# Patient Record
Sex: FEMALE | Race: BLACK OR AFRICAN AMERICAN | NOT HISPANIC OR LATINO | Employment: PART TIME
[De-identification: names, ages, dates, MRNs, and addresses within clinical notes are randomized per-mention and may not be internally consistent; named-entity substitution may affect disease eponyms.]

---

## 2020-01-03 ENCOUNTER — HOSPITAL (OUTPATIENT)
Dept: OTHER | Age: 45
End: 2020-01-03

## 2020-01-03 LAB
ALBUMIN SERPL-MCNC: 3.6 G/DL (ref 3.6–5.1)
ALBUMIN/GLOB SERPL: 1 {RATIO} (ref 1–2.4)
ALP SERPL-CCNC: 50 UNITS/L (ref 45–117)
ALT SERPL-CCNC: 22 UNITS/L
AMORPH SED URNS QL MICRO: ABNORMAL
ANALYZER ANC (IANC): ABNORMAL
ANION GAP SERPL CALC-SCNC: 11 MMOL/L (ref 10–20)
APPEARANCE UR: CLEAR
APPEARANCE UR: CLEAR
AST SERPL-CCNC: 17 UNITS/L
BACTERIA #/AREA URNS HPF: ABNORMAL /HPF
BASOPHILS # BLD: 0 K/MCL (ref 0–0.3)
BASOPHILS NFR BLD: 0 %
BILIRUB SERPL-MCNC: 0.7 MG/DL (ref 0.2–1)
BILIRUB UR QL: NEGATIVE
BILIRUB UR QL: NEGATIVE
BUN SERPL-MCNC: 10 MG/DL (ref 6–20)
BUN/CREAT SERPL: 13 (ref 7–25)
CALCIUM SERPL-MCNC: 8.6 MG/DL (ref 8.4–10.2)
CAOX CRY URNS QL MICRO: ABNORMAL
CHLORIDE SERPL-SCNC: 106 MMOL/L (ref 98–107)
CLUE CELLS SPEC QL WET PREP: NORMAL
CO2 SERPL-SCNC: 25 MMOL/L (ref 21–32)
COLOR UR: YELLOW
COLOR UR: YELLOW
CREAT SERPL-MCNC: 0.8 MG/DL (ref 0.51–0.95)
DIFFERENTIAL METHOD BLD: ABNORMAL
EOSINOPHIL # BLD: 0.2 K/MCL (ref 0.1–0.5)
EOSINOPHIL NFR BLD: 5 %
EPITH CASTS #/AREA URNS LPF: ABNORMAL /[LPF]
ERYTHROCYTE [DISTWIDTH] IN BLOOD: 12.9 % (ref 11–15)
FATTY CASTS #/AREA URNS LPF: ABNORMAL /[LPF]
GLOBULIN SER-MCNC: 3.7 G/DL (ref 2–4)
GLUCOSE SERPL-MCNC: 87 MG/DL (ref 65–99)
GLUCOSE UR-MCNC: NEGATIVE MG/DL
GLUCOSE UR-MCNC: NEGATIVE MG/DL
GRAN CASTS #/AREA URNS LPF: ABNORMAL /[LPF]
HCG POINT OF CARE (5HGRST): NEGATIVE
HCT VFR BLD CALC: 37.5 % (ref 36–46.5)
HGB BLD-MCNC: 12 G/DL (ref 12–15.5)
HGB UR QL: NEGATIVE
HGB UR QL: NEGATIVE
HYALINE CASTS #/AREA URNS LPF: ABNORMAL /LPF (ref 0–5)
IMM GRANULOCYTES # BLD AUTO: 0 K/MCL (ref 0–0.2)
IMM GRANULOCYTES NFR BLD: 0 %
KETONES UR-MCNC: ABNORMAL MG/DL
KETONES UR-MCNC: ABNORMAL MG/DL
LEUKOCYTE ESTERASE UR QL STRIP: NEGATIVE
LEUKOCYTE ESTERASE UR QL STRIP: NEGATIVE
LYMPHOCYTES # BLD: 2.7 K/MCL (ref 1–4.8)
LYMPHOCYTES NFR BLD: 57 %
MCH RBC QN AUTO: 29.1 PG (ref 26–34)
MCHC RBC AUTO-ENTMCNC: 32 G/DL (ref 32–36.5)
MCV RBC AUTO: 90.8 FL (ref 78–100)
MICROSCOPIC (MT): ABNORMAL
MIXED CELL CASTS #/AREA URNS LPF: ABNORMAL /[LPF]
MONOCYTES # BLD: 0.5 K/MCL (ref 0.3–0.9)
MONOCYTES NFR BLD: 11 %
MUCOUS THREADS URNS QL MICRO: PRESENT
NEUTROPHILS # BLD: 1.3 K/MCL (ref 1.8–7.7)
NEUTROPHILS NFR BLD: 27 %
NEUTS SEG NFR BLD: ABNORMAL %
NITRITE UR QL: NEGATIVE
NITRITE UR QL: NEGATIVE
NRBC (NRBCRE): 0 /100 WBC
PH UR: 5 UNITS (ref 5–7)
PH UR: 5 UNITS (ref 5–7)
PLATELET # BLD: 301 K/MCL (ref 140–450)
POTASSIUM SERPL-SCNC: 3.6 MMOL/L (ref 3.4–5.1)
PROT SERPL-MCNC: 7.3 G/DL (ref 6.4–8.2)
PROT UR QL: NEGATIVE MG/DL
PROT UR QL: NEGATIVE MG/DL
RBC # BLD: 4.13 MIL/MCL (ref 4–5.2)
RBC #/AREA URNS HPF: ABNORMAL /HPF (ref 0–2)
RBC CASTS #/AREA URNS LPF: ABNORMAL /[LPF]
RENAL EPI CELLS #/AREA URNS HPF: ABNORMAL /[HPF]
SODIUM SERPL-SCNC: 138 MMOL/L (ref 135–145)
SP GR UR: 1.03 (ref 1–1.03)
SP GR UR: 1.03 (ref 1–1.03)
SPECIMEN SOURCE: ABNORMAL
SPECIMEN SOURCE: ABNORMAL
SPERM URNS QL MICRO: ABNORMAL
SQUAMOUS #/AREA URNS HPF: ABNORMAL /HPF (ref 0–5)
T VAGINALIS SPEC QL WET PREP: NORMAL
T VAGINALIS URNS QL MICRO: ABNORMAL
TRI-PHOS CRY URNS QL MICRO: ABNORMAL
URATE CRY URNS QL MICRO: ABNORMAL
UROBILINOGEN UR QL: 0.2 MG/DL (ref 0–1)
UROBILINOGEN UR QL: 2 MG/DL (ref 0–1)
WAXY CASTS #/AREA URNS LPF: ABNORMAL /[LPF]
WBC # BLD: 4.7 K/MCL (ref 4.2–11)
WBC #/AREA URNS HPF: ABNORMAL /HPF (ref 0–5)
WBC CASTS #/AREA URNS LPF: ABNORMAL /[LPF]
YEAST HYPHAE URNS QL MICRO: ABNORMAL
YEAST SPEC QL WET PREP: NORMAL
YEAST URNS QL MICRO: ABNORMAL

## 2020-01-03 PROCEDURE — 99284 EMERGENCY DEPT VISIT MOD MDM: CPT | Performed by: EMERGENCY MEDICINE

## 2021-08-26 ENCOUNTER — APPOINTMENT (OUTPATIENT)
Dept: CT IMAGING | Age: 46
End: 2021-08-26
Attending: EMERGENCY MEDICINE

## 2021-08-26 ENCOUNTER — HOSPITAL ENCOUNTER (EMERGENCY)
Age: 46
Discharge: HOME | End: 2021-08-27
Attending: EMERGENCY MEDICINE

## 2021-08-26 DIAGNOSIS — N94.0 MITTELSCHMERZ: ICD-10-CM

## 2021-08-26 DIAGNOSIS — R10.2 PELVIC PAIN IN FEMALE: Primary | ICD-10-CM

## 2021-08-26 DIAGNOSIS — N94.89 PELVIC CONGESTION: ICD-10-CM

## 2021-08-26 LAB
ALBUMIN SERPL-MCNC: 3.3 G/DL (ref 3.6–5.1)
ALBUMIN/GLOB SERPL: 0.9 {RATIO} (ref 1–2.4)
ALP SERPL-CCNC: 53 UNITS/L (ref 45–117)
ALT SERPL-CCNC: 17 UNITS/L
ANION GAP SERPL CALC-SCNC: 6 MMOL/L (ref 10–20)
AST SERPL-CCNC: 15 UNITS/L
BASOPHILS # BLD: 0 K/MCL (ref 0–0.3)
BASOPHILS NFR BLD: 0 %
BILIRUB SERPL-MCNC: 0.2 MG/DL (ref 0.2–1)
BUN SERPL-MCNC: 10 MG/DL (ref 6–20)
BUN/CREAT SERPL: 12 (ref 7–25)
CALCIUM SERPL-MCNC: 8.5 MG/DL (ref 8.4–10.2)
CHLORIDE SERPL-SCNC: 110 MMOL/L (ref 98–107)
CO2 SERPL-SCNC: 27 MMOL/L (ref 21–32)
CREAT SERPL-MCNC: 0.82 MG/DL (ref 0.51–0.95)
DEPRECATED RDW RBC: 43.7 FL (ref 39–50)
EOSINOPHIL # BLD: 0.2 K/MCL (ref 0–0.5)
EOSINOPHIL NFR BLD: 4 %
ERYTHROCYTE [DISTWIDTH] IN BLOOD: 13.1 % (ref 11–15)
FASTING DURATION TIME PATIENT: ABNORMAL H
GFR SERPLBLD BASED ON 1.73 SQ M-ARVRAT: >90 ML/MIN
GLOBULIN SER-MCNC: 3.7 G/DL (ref 2–4)
GLUCOSE SERPL-MCNC: 98 MG/DL (ref 65–99)
HCG SERPL QL: NEGATIVE
HCT VFR BLD CALC: 35.2 % (ref 36–46.5)
HGB BLD-MCNC: 11.1 G/DL (ref 12–15.5)
IMM GRANULOCYTES # BLD AUTO: 0 K/MCL (ref 0–0.2)
IMM GRANULOCYTES # BLD: 0 %
LIPASE SERPL-CCNC: 120 UNITS/L (ref 73–393)
LYMPHOCYTES # BLD: 3.2 K/MCL (ref 1–4.8)
LYMPHOCYTES NFR BLD: 59 %
MAGNESIUM SERPL-MCNC: 2 MG/DL (ref 1.7–2.4)
MCH RBC QN AUTO: 28.8 PG (ref 26–34)
MCHC RBC AUTO-ENTMCNC: 31.5 G/DL (ref 32–36.5)
MCV RBC AUTO: 91.4 FL (ref 78–100)
MONOCYTES # BLD: 0.4 K/MCL (ref 0.3–0.9)
MONOCYTES NFR BLD: 8 %
NEUTROPHILS # BLD: 1.6 K/MCL (ref 1.8–7.7)
NEUTROPHILS NFR BLD: 29 %
NRBC BLD MANUAL-RTO: 0 /100 WBC
PLATELET # BLD AUTO: 278 K/MCL (ref 140–450)
POTASSIUM SERPL-SCNC: 4.1 MMOL/L (ref 3.4–5.1)
PROT SERPL-MCNC: 7 G/DL (ref 6.4–8.2)
RBC # BLD: 3.85 MIL/MCL (ref 4–5.2)
SODIUM SERPL-SCNC: 139 MMOL/L (ref 135–145)
WBC # BLD: 5.5 K/MCL (ref 4.2–11)

## 2021-08-26 PROCEDURE — 10002805 HB CONTRAST AGENT: Performed by: EMERGENCY MEDICINE

## 2021-08-26 PROCEDURE — 80053 COMPREHEN METABOLIC PANEL: CPT | Performed by: EMERGENCY MEDICINE

## 2021-08-26 PROCEDURE — 84703 CHORIONIC GONADOTROPIN ASSAY: CPT | Performed by: EMERGENCY MEDICINE

## 2021-08-26 PROCEDURE — 85025 COMPLETE CBC W/AUTO DIFF WBC: CPT | Performed by: EMERGENCY MEDICINE

## 2021-08-26 PROCEDURE — 10002807 HB RX 258: Performed by: EMERGENCY MEDICINE

## 2021-08-26 PROCEDURE — 74177 CT ABD & PELVIS W/CONTRAST: CPT

## 2021-08-26 PROCEDURE — 99284 EMERGENCY DEPT VISIT MOD MDM: CPT

## 2021-08-26 PROCEDURE — 10002800 HB RX 250 W HCPCS: Performed by: EMERGENCY MEDICINE

## 2021-08-26 PROCEDURE — 96374 THER/PROPH/DIAG INJ IV PUSH: CPT

## 2021-08-26 PROCEDURE — 96375 TX/PRO/DX INJ NEW DRUG ADDON: CPT

## 2021-08-26 PROCEDURE — 83735 ASSAY OF MAGNESIUM: CPT | Performed by: EMERGENCY MEDICINE

## 2021-08-26 PROCEDURE — 83690 ASSAY OF LIPASE: CPT | Performed by: EMERGENCY MEDICINE

## 2021-08-26 PROCEDURE — 96361 HYDRATE IV INFUSION ADD-ON: CPT

## 2021-08-26 RX ADMIN — IOHEXOL 85 ML: 350 INJECTION, SOLUTION INTRAVENOUS at 22:28

## 2021-08-26 RX ADMIN — KETOROLAC TROMETHAMINE 15 MG: 15 INJECTION, SOLUTION INTRAMUSCULAR; INTRAVENOUS at 22:20

## 2021-08-26 RX ADMIN — FENTANYL CITRATE 100 MCG: 50 INJECTION INTRAMUSCULAR; INTRAVENOUS at 22:21

## 2021-08-26 RX ADMIN — SODIUM CHLORIDE, POTASSIUM CHLORIDE, SODIUM LACTATE AND CALCIUM CHLORIDE 1000 ML: 600; 310; 30; 20 INJECTION, SOLUTION INTRAVENOUS at 22:20

## 2021-08-27 ENCOUNTER — APPOINTMENT (OUTPATIENT)
Dept: ULTRASOUND IMAGING | Age: 46
End: 2021-08-27
Attending: EMERGENCY MEDICINE

## 2021-08-27 VITALS
TEMPERATURE: 97.9 F | HEIGHT: 64 IN | WEIGHT: 172.18 LBS | RESPIRATION RATE: 16 BRPM | DIASTOLIC BLOOD PRESSURE: 82 MMHG | OXYGEN SATURATION: 100 % | BODY MASS INDEX: 29.4 KG/M2 | HEART RATE: 62 BPM | SYSTOLIC BLOOD PRESSURE: 99 MMHG

## 2021-08-27 PROCEDURE — 10002803 HB RX 637: Performed by: EMERGENCY MEDICINE

## 2021-08-27 PROCEDURE — 93975 VASCULAR STUDY: CPT

## 2021-08-27 PROCEDURE — 76856 US EXAM PELVIC COMPLETE: CPT

## 2021-08-27 RX ORDER — HYDROCODONE BITARTRATE AND ACETAMINOPHEN 5; 325 MG/1; MG/1
1-2 TABLET ORAL 2 TIMES DAILY PRN
Qty: 11 TABLET | Refills: 0 | Status: SHIPPED | OUTPATIENT
Start: 2021-08-27 | End: 2021-09-03

## 2021-08-27 RX ORDER — IBUPROFEN 600 MG/1
600 TABLET ORAL EVERY 8 HOURS PRN
Qty: 21 TABLET | Refills: 0 | Status: SHIPPED | OUTPATIENT
Start: 2021-08-27 | End: 2021-09-03

## 2021-08-27 RX ORDER — HYDROCODONE BITARTRATE AND ACETAMINOPHEN 5; 325 MG/1; MG/1
2 TABLET ORAL ONCE
Status: COMPLETED | OUTPATIENT
Start: 2021-08-27 | End: 2021-08-27

## 2021-08-27 RX ADMIN — HYDROCODONE BITARTRATE AND ACETAMINOPHEN 2 TABLET: 5; 325 TABLET ORAL at 02:03

## 2024-12-03 ENCOUNTER — APPOINTMENT (OUTPATIENT)
Dept: GENERAL RADIOLOGY | Age: 49
End: 2024-12-03

## 2024-12-03 ENCOUNTER — HOSPITAL ENCOUNTER (EMERGENCY)
Age: 49
Discharge: HOME | End: 2024-12-03

## 2024-12-03 ENCOUNTER — APPOINTMENT (OUTPATIENT)
Dept: CT IMAGING | Age: 49
End: 2024-12-03

## 2024-12-03 VITALS
DIASTOLIC BLOOD PRESSURE: 83 MMHG | SYSTOLIC BLOOD PRESSURE: 117 MMHG | HEIGHT: 65 IN | HEART RATE: 72 BPM | WEIGHT: 185.41 LBS | BODY MASS INDEX: 30.89 KG/M2 | TEMPERATURE: 98.1 F | OXYGEN SATURATION: 99 % | RESPIRATION RATE: 17 BRPM

## 2024-12-03 DIAGNOSIS — R42 DIZZINESS: Primary | ICD-10-CM

## 2024-12-03 DIAGNOSIS — K21.9 GASTROESOPHAGEAL REFLUX DISEASE, UNSPECIFIED WHETHER ESOPHAGITIS PRESENT: ICD-10-CM

## 2024-12-03 LAB
ALBUMIN SERPL-MCNC: 3.7 G/DL (ref 3.4–5)
ALBUMIN/GLOB SERPL: 0.9 {RATIO} (ref 1–2.4)
ALP SERPL-CCNC: 60 UNITS/L (ref 45–117)
ALT SERPL-CCNC: 33 UNITS/L
ANION GAP SERPL CALC-SCNC: 11 MMOL/L (ref 7–19)
AST SERPL-CCNC: 23 UNITS/L
BASOPHILS # BLD: 0 K/MCL (ref 0–0.3)
BASOPHILS NFR BLD: 1 %
BILIRUB SERPL-MCNC: 0.4 MG/DL (ref 0.2–1)
BUN SERPL-MCNC: 14 MG/DL (ref 6–20)
BUN/CREAT SERPL: 17 (ref 7–25)
CALCIUM SERPL-MCNC: 8.6 MG/DL (ref 8.4–10.2)
CHLORIDE SERPL-SCNC: 103 MMOL/L (ref 97–110)
CO2 SERPL-SCNC: 29 MMOL/L (ref 21–32)
CREAT SERPL-MCNC: 0.81 MG/DL (ref 0.51–0.95)
DEPRECATED RDW RBC: 43.8 FL (ref 39–50)
EGFRCR SERPLBLD CKD-EPI 2021: 90 ML/MIN/{1.73_M2}
EOSINOPHIL # BLD: 0.1 K/MCL (ref 0–0.5)
EOSINOPHIL NFR BLD: 2 %
ERYTHROCYTE [DISTWIDTH] IN BLOOD: 13.2 % (ref 11–15)
FASTING DURATION TIME PATIENT: ABNORMAL H
GLOBULIN SER-MCNC: 3.9 G/DL (ref 2–4)
GLUCOSE SERPL-MCNC: 92 MG/DL (ref 70–99)
HCT VFR BLD CALC: 39.1 % (ref 36–46.5)
HGB BLD-MCNC: 12.4 G/DL (ref 12–15.5)
IMM GRANULOCYTES # BLD AUTO: 0 K/MCL (ref 0–0.2)
IMM GRANULOCYTES # BLD: 0 %
LIPASE SERPL-CCNC: 33 UNITS/L (ref 15–77)
LYMPHOCYTES # BLD: 1.6 K/MCL (ref 1–4.8)
LYMPHOCYTES NFR BLD: 38 %
MCH RBC QN AUTO: 28.7 PG (ref 26–34)
MCHC RBC AUTO-ENTMCNC: 31.7 G/DL (ref 32–36.5)
MCV RBC AUTO: 90.5 FL (ref 78–100)
MONOCYTES # BLD: 0.3 K/MCL (ref 0.3–0.9)
MONOCYTES NFR BLD: 8 %
NEUTROPHILS # BLD: 2.1 K/MCL (ref 1.8–7.7)
NEUTROPHILS NFR BLD: 51 %
NRBC BLD MANUAL-RTO: 0 /100 WBC
NT-PROBNP SERPL-MCNC: <35 PG/ML
PLATELET # BLD AUTO: 267 K/MCL (ref 140–450)
POTASSIUM SERPL-SCNC: 4 MMOL/L (ref 3.4–5.1)
PROT SERPL-MCNC: 7.6 G/DL (ref 6.4–8.2)
RBC # BLD: 4.32 MIL/MCL (ref 4–5.2)
SODIUM SERPL-SCNC: 139 MMOL/L (ref 135–145)
TROPONIN I SERPL DL<=0.01 NG/ML-MCNC: <4 NG/L
TSH SERPL-ACNC: 1.05 MCUNITS/ML (ref 0.35–5)
WBC # BLD: 4.1 K/MCL (ref 4.2–11)

## 2024-12-03 PROCEDURE — 85025 COMPLETE CBC W/AUTO DIFF WBC: CPT

## 2024-12-03 PROCEDURE — 96375 TX/PRO/DX INJ NEW DRUG ADDON: CPT

## 2024-12-03 PROCEDURE — 93005 ELECTROCARDIOGRAM TRACING: CPT

## 2024-12-03 PROCEDURE — 83690 ASSAY OF LIPASE: CPT

## 2024-12-03 PROCEDURE — 70498 CT ANGIOGRAPHY NECK: CPT

## 2024-12-03 PROCEDURE — 84484 ASSAY OF TROPONIN QUANT: CPT

## 2024-12-03 PROCEDURE — 71046 X-RAY EXAM CHEST 2 VIEWS: CPT

## 2024-12-03 PROCEDURE — 10002801 HB RX 250 W/O HCPCS

## 2024-12-03 PROCEDURE — 83880 ASSAY OF NATRIURETIC PEPTIDE: CPT

## 2024-12-03 PROCEDURE — 10002807 HB RX 258

## 2024-12-03 PROCEDURE — 10002805 HB CONTRAST AGENT

## 2024-12-03 PROCEDURE — 10002803 HB RX 637

## 2024-12-03 PROCEDURE — 99285 EMERGENCY DEPT VISIT HI MDM: CPT

## 2024-12-03 PROCEDURE — 10002800 HB RX 250 W HCPCS

## 2024-12-03 PROCEDURE — 80053 COMPREHEN METABOLIC PANEL: CPT

## 2024-12-03 PROCEDURE — 84443 ASSAY THYROID STIM HORMONE: CPT

## 2024-12-03 PROCEDURE — 96374 THER/PROPH/DIAG INJ IV PUSH: CPT

## 2024-12-03 PROCEDURE — 96361 HYDRATE IV INFUSION ADD-ON: CPT

## 2024-12-03 RX ORDER — HYDROXYZINE HYDROCHLORIDE 25 MG/1
25 TABLET, FILM COATED ORAL EVERY 8 HOURS PRN
Qty: 30 TABLET | Refills: 0 | Status: SHIPPED | OUTPATIENT
Start: 2024-12-03

## 2024-12-03 RX ORDER — DIPHENHYDRAMINE HYDROCHLORIDE 50 MG/ML
25 INJECTION INTRAMUSCULAR; INTRAVENOUS ONCE
Status: COMPLETED | OUTPATIENT
Start: 2024-12-03 | End: 2024-12-03

## 2024-12-03 RX ORDER — FAMOTIDINE 10 MG/ML
20 INJECTION, SOLUTION INTRAVENOUS ONCE
Status: COMPLETED | OUTPATIENT
Start: 2024-12-03 | End: 2024-12-03

## 2024-12-03 RX ORDER — MAGNESIUM HYDROXIDE/ALUMINUM HYDROXICE/SIMETHICONE 120; 1200; 1200 MG/30ML; MG/30ML; MG/30ML
30 SUSPENSION ORAL ONCE
Status: COMPLETED | OUTPATIENT
Start: 2024-12-03 | End: 2024-12-03

## 2024-12-03 RX ORDER — FAMOTIDINE 20 MG/1
20 TABLET, FILM COATED ORAL 2 TIMES DAILY
Qty: 30 TABLET | Refills: 0 | Status: SHIPPED | OUTPATIENT
Start: 2024-12-03

## 2024-12-03 RX ORDER — MECLIZINE HYDROCHLORIDE 25 MG/1
25 TABLET ORAL 3 TIMES DAILY PRN
Qty: 15 TABLET | Refills: 0 | Status: SHIPPED | OUTPATIENT
Start: 2024-12-03

## 2024-12-03 RX ORDER — MECLIZINE HCL 12.5 MG 12.5 MG/1
25 TABLET ORAL ONCE
Status: COMPLETED | OUTPATIENT
Start: 2024-12-03 | End: 2024-12-03

## 2024-12-03 RX ORDER — KETOROLAC TROMETHAMINE 15 MG/ML
15 INJECTION, SOLUTION INTRAMUSCULAR; INTRAVENOUS ONCE
Status: COMPLETED | OUTPATIENT
Start: 2024-12-03 | End: 2024-12-03

## 2024-12-03 RX ADMIN — MECLIZINE HCL 12.5 MG 25 MG: 12.5 TABLET ORAL at 18:33

## 2024-12-03 RX ADMIN — METHYLPREDNISOLONE SODIUM SUCCINATE 60 MG: 125 INJECTION, POWDER, FOR SOLUTION INTRAMUSCULAR; INTRAVENOUS at 19:23

## 2024-12-03 RX ADMIN — IOHEXOL 75 ML: 350 INJECTION, SOLUTION INTRAVENOUS at 20:09

## 2024-12-03 RX ADMIN — SODIUM CHLORIDE 1000 ML: 9 INJECTION, SOLUTION INTRAVENOUS at 18:39

## 2024-12-03 RX ADMIN — ALUMINUM HYDROXIDE, MAGNESIUM HYDROXIDE, DIMETHICONE 30 ML: 200; 200; 20 LIQUID ORAL at 18:33

## 2024-12-03 RX ADMIN — FAMOTIDINE 20 MG: 10 INJECTION INTRAVENOUS at 18:33

## 2024-12-03 RX ADMIN — KETOROLAC TROMETHAMINE 15 MG: 15 INJECTION, SOLUTION INTRAMUSCULAR; INTRAVENOUS at 18:33

## 2024-12-03 RX ADMIN — DIPHENHYDRAMINE HYDROCHLORIDE 25 MG: 50 INJECTION INTRAMUSCULAR; INTRAVENOUS at 19:23

## 2024-12-03 ASSESSMENT — MOVEMENT AND STRENGTH ASSESSMENTS
HEAD_NECK: FULL RANGE OF MOTION
FACE_JAW: FACE SYMMETRICAL
ALL_EXTREMITIES: EQUAL STRENGTH/TONE/MOVEMENT

## 2024-12-03 ASSESSMENT — PAIN DESCRIPTION - PAIN TYPE: TYPE: CHEST PAIN

## 2024-12-03 ASSESSMENT — PAIN SCALES - GENERAL: PAINLEVEL_OUTOF10: 6

## 2024-12-06 LAB
ATRIAL RATE (BPM): 61
P AXIS (DEGREES): 65
PR-INTERVAL (MSEC): 158
QRS-INTERVAL (MSEC): 76
QT-INTERVAL (MSEC): 368
QTC: 371
R AXIS (DEGREES): 73
REPORT TEXT: NORMAL
T AXIS (DEGREES): 64
VENTRICULAR RATE EKG/MIN (BPM): 61

## 2024-12-28 ENCOUNTER — HOSPITAL ENCOUNTER (EMERGENCY)
Facility: HOSPITAL | Age: 49
Discharge: HOME | End: 2024-12-28
Payer: COMMERCIAL

## 2024-12-28 ENCOUNTER — APPOINTMENT (OUTPATIENT)
Dept: GENERAL RADIOLOGY | Facility: HOSPITAL | Age: 49
End: 2024-12-28
Attending: NURSE PRACTITIONER
Payer: COMMERCIAL

## 2024-12-28 ENCOUNTER — NURSE TRIAGE (OUTPATIENT)
Dept: TELEHEALTH | Age: 49
End: 2024-12-28

## 2024-12-28 VITALS
SYSTOLIC BLOOD PRESSURE: 112 MMHG | TEMPERATURE: 98 F | HEART RATE: 88 BPM | OXYGEN SATURATION: 100 % | RESPIRATION RATE: 17 BRPM | DIASTOLIC BLOOD PRESSURE: 77 MMHG

## 2024-12-28 DIAGNOSIS — S80.02XA CONTUSION OF LEFT KNEE, INITIAL ENCOUNTER: ICD-10-CM

## 2024-12-28 DIAGNOSIS — M24.552 CONTRACTURE OF LEFT HIP: ICD-10-CM

## 2024-12-28 DIAGNOSIS — S50.02XA CONTUSION OF LEFT ELBOW, INITIAL ENCOUNTER: ICD-10-CM

## 2024-12-28 DIAGNOSIS — S60.212A CONTUSION OF LEFT WRIST, INITIAL ENCOUNTER: ICD-10-CM

## 2024-12-28 DIAGNOSIS — S93.402A SPRAIN OF LEFT ANKLE, UNSPECIFIED LIGAMENT, INITIAL ENCOUNTER: Primary | ICD-10-CM

## 2024-12-28 LAB — B-HCG UR QL: NEGATIVE

## 2024-12-28 PROCEDURE — 73080 X-RAY EXAM OF ELBOW: CPT | Performed by: NURSE PRACTITIONER

## 2024-12-28 PROCEDURE — 99284 EMERGENCY DEPT VISIT MOD MDM: CPT

## 2024-12-28 PROCEDURE — 73502 X-RAY EXAM HIP UNI 2-3 VIEWS: CPT | Performed by: NURSE PRACTITIONER

## 2024-12-28 PROCEDURE — 99283 EMERGENCY DEPT VISIT LOW MDM: CPT

## 2024-12-28 PROCEDURE — 73610 X-RAY EXAM OF ANKLE: CPT | Performed by: NURSE PRACTITIONER

## 2024-12-28 PROCEDURE — 81025 URINE PREGNANCY TEST: CPT

## 2024-12-28 PROCEDURE — 73560 X-RAY EXAM OF KNEE 1 OR 2: CPT | Performed by: NURSE PRACTITIONER

## 2024-12-28 PROCEDURE — 73110 X-RAY EXAM OF WRIST: CPT | Performed by: NURSE PRACTITIONER

## 2024-12-29 NOTE — ED PROVIDER NOTES
Patient Seen in: [facility] Emergency Department      History     Chief Complaint   Patient presents with    Fall     Stated Complaint: Fall, Body Pain    Subjective:   50yo/f w no chronic medical problems reports to the ED w c/o a fall while [place] the mall yesterday. Patient walked in and slipped on the tile as it transitioned from rug. Fell onto left side. Pain to left elbow/wrist and left hip/knee/ankle. Ambulatory on scene and ER. No deformities. No anticoagulation. No weakness. No lacerations. No weakness.               Objective:     History reviewed. No pertinent past medical history.           History reviewed. No pertinent surgical history.             Social History     Socioeconomic History    Marital status: Single   Tobacco Use    Smoking status: Never    Smokeless tobacco: Never   Vaping Use    Vaping status: Never Used   Substance and Sexual Activity    Alcohol use: Yes     Comment: Occasional    Drug use: Never                  Physical Exam     ED Triage Vitals [12/28/24 2023]   /72   Pulse 90   Resp 17   Temp 98.3 °F (36.8 °C)   Temp src Oral   SpO2 98 %   O2 Device None (Room air)       Current Vitals:   Vital Signs  BP: 106/72  Pulse: 90  Resp: 17  Temp: 98.3 °F (36.8 °C)  Temp src: Oral    Oxygen Therapy  SpO2: 98 %  O2 Device: None (Room air)        Physical Exam  Vitals and nursing note reviewed.   Constitutional:       General: She is not in acute distress.     Appearance: She is well-developed.   HENT:      Head: Normocephalic and atraumatic.      Nose: Nose normal.      Mouth/Throat:      Mouth: Mucous membranes are moist.   Eyes:      Conjunctiva/sclera: Conjunctivae normal.      Pupils: Pupils are equal, round, and reactive to light.   Cardiovascular:      Rate and Rhythm: Normal rate and regular rhythm.      Heart sounds: Normal heart sounds.   Pulmonary:      Effort: Pulmonary effort is normal.      Breath sounds: Normal breath sounds.   Abdominal:      General: Bowel  sounds are normal.      Palpations: Abdomen is soft.   Musculoskeletal:         General: Tenderness present. No deformity. Normal range of motion.      Cervical back: Normal range of motion and neck supple.   Skin:     General: Skin is warm and dry.      Capillary Refill: Capillary refill takes less than 2 seconds.      Findings: No rash.      Comments: Normal color   Neurological:      General: No focal deficit present.      Mental Status: She is alert and oriented to person, place, and time.      GCS: GCS eye subscore is 4. GCS verbal subscore is 5. GCS motor subscore is 6.      Cranial Nerves: No cranial nerve deficit.      Sensory: No sensory deficit.      Motor: No weakness.      Coordination: Coordination normal.      Gait: Gait normal.      Deep Tendon Reflexes: Reflexes normal.             ED Course     Labs Reviewed   POCT PREGNANCY URINE - Normal          XR left elbow  XR left wrist    IMPRESSION:   No radiographic evidence of acute fracture.   The joint spaces and alignment appear preserved.   No large elbow joint effusion.  XR left hip with pelvis  XR left knee  XR left ankle    IMPRESSION:   No radiographic evidence of acute fracture.   The joint spaces and alignment appear preserved.   No large knee or ankle joint effusion.       MDM              Medical Decision Making  50yo/f w hx and exam as stated; left leg and left arm injury sp fall    Distal cms intact  No edema  No crepitus  No laxity  Overall stable  No weakness  Xrays non acute  Skin intact  Strong pulses  Soft compartments    Plan  Dc to home  Close fu      Amount and/or Complexity of Data Reviewed  Radiology:  Decision-making details documented in ED Course.    Risk  OTC drugs.  Prescription drug management.        Disposition and Plan     Clinical Impression:  1. Sprain of left ankle, unspecified ligament, initial encounter    2. Contusion of left knee, initial encounter    3. Contusion of left elbow, initial encounter    4. Contusion of  left wrist, initial encounter    5. Contracture of left hip         Disposition:  Discharge  12/28/2024 10:27 pm    Follow-up:  Abhijeet Butler, DO  [address] [place] [id number] [phone]    Follow up in 2 day(s)            Medications Prescribed:  There are no discharge medications for this patient.          Supplementary Documentation:

## 2024-12-29 NOTE — ED INITIAL ASSESSMENT (HPI)
Pt here for a fall yesterday at the mall. Pt stated she was walking and slipped on the floor and fell on the left outer side of her left leg. Also having pain in the right out foot. C/o of pain in left shoulder and pinching in the lower back. Pt noticed swelling of the left ankle. Able to bear weight.

## 2024-12-29 NOTE — ED QUICK NOTES
Importance of MD follow up reviewed. Pt verbalized understanding. Pt ambulating by self with steady gait.

## 2025-01-02 ENCOUNTER — HOSPITAL ENCOUNTER (OUTPATIENT)
Dept: GENERAL RADIOLOGY | Age: 50
Discharge: HOME | End: 2025-01-02
Attending: FAMILY MEDICINE
Payer: COMMERCIAL

## 2025-01-02 ENCOUNTER — OFFICE VISIT (OUTPATIENT)
Dept: FAMILY MEDICINE CLINIC | Facility: CLINIC | Age: 50
End: 2025-01-02

## 2025-01-02 VITALS — WEIGHT: 184 LBS | DIASTOLIC BLOOD PRESSURE: 66 MMHG | HEART RATE: 86 BPM | SYSTOLIC BLOOD PRESSURE: 100 MMHG
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
## 2025-01-02 DIAGNOSIS — M25.552 PAIN OF LEFT HIP: ICD-10-CM

## 2025-01-02 DIAGNOSIS — M79.662 PAIN IN LEFT LOWER LEG: ICD-10-CM

## 2025-01-02 DIAGNOSIS — S93.492D SPRAIN OF OTHER LIGAMENT OF LEFT ANKLE, SUBSEQUENT ENCOUNTER: ICD-10-CM
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
## 2025-01-02 DIAGNOSIS — M25.522 PAIN IN LEFT ELBOW: ICD-10-CM

## 2025-01-02 PROCEDURE — 99214 OFFICE O/P EST MOD 30 MIN: CPT | Performed by: FAMILY MEDICINE

## 2025-01-02 PROCEDURE — 73590 X-RAY EXAM OF LOWER LEG: CPT | Performed by: FAMILY MEDICINE

## 2025-01-02 RX ORDER — FAMOTIDINE 20 MG/1
20 TABLET, FILM COATED ORAL 2 TIMES DAILY
COMMUNITY
Start: 2024-12-03

## 2025-01-02 RX ORDER — MECLIZINE HYDROCHLORIDE 25 MG/1
1 TABLET ORAL 3 TIMES DAILY PRN
COMMUNITY
Start: 2024-12-03

## 2025-01-02 RX ORDER — DICLOFENAC SODIUM 75 MG/1
75 TABLET, DELAYED RELEASE ORAL 2 TIMES DAILY
Qty: 60 TABLET | Refills: 0 | Status: SHIPPED | OUTPATIENT
Start: 2025-01-02

## 2025-01-02 RX ORDER — HYDROXYZINE HYDROCHLORIDE 25 MG/1
1 TABLET, FILM COATED ORAL EVERY 8 HOURS PRN
COMMUNITY
Start: 2024-12-03

## 2025-01-02 NOTE — PROGRESS NOTES
HPI:   Sergei Maradiaga is a 49 year old female who presents for:    Patient presents with:  ER F/U: Patient states that she fell at [place] [employer], she slipped on wet grant, she states she fell and landed on her left side  She was seen at ER 12/28/2024 for evaluation:  All XRays there-no Fx or acute injury  Her diagnoses on discharge were:  1.  sprain of left ankle, unspecified ligament, initial encounter    2. Contusion of left knee, initial encounter   3. Contusion of left elbow, initial encounter   4. Contusion of left wrist, initial encounter   5. Contracture of left hip     She complains of left lateral ankle pain radiating up her lateral lower leg, left elbow pain, left posterior hip pain, she also has some left lower back pain, she states initially she had some neck pain which has now resolved  Denies any loss of consciousness or head injury, she is taking Tylenol for pain, not on anti-inflammatories          See ROS for pertinent positive and negative complaints.    Allergies[1]    Current Outpatient Medications   Medication Sig Dispense Refill    famotidine 20 MG Oral Tab Take 1 tablet (20 mg total) by mouth 2 (two) times daily.      meclizine 25 MG Oral Tab Take 1 tablet (25 mg total) by mouth 3 (three) times daily as needed.      hydrOXYzine 25 MG Oral Tab Take 1 tablet (25 mg total) by mouth every 8 (eight) hours as needed.      diclofenac 75 MG Oral Tab EC Take 1 tablet (75 mg total) by mouth 2 (two) times daily. X 3-4 days then prn, take with food, stop with heartburn or side effects occur 60 tablet 0     History reviewed. No pertinent past medical history.  History reviewed. No pertinent surgical history.    REVIEW OF SYSTEMS:   Review of Systems   Constitutional: Negative.    HENT: Negative.     Respiratory: Negative.     Cardiovascular: Negative.    Gastrointestinal: Negative.    Musculoskeletal:  Positive for arthralgias, back pain, joint swelling and myalgias. Negative for gait problem, neck pain  and neck stiffness.   Skin: Negative.  Negative for wound.   Neurological: Negative.  Negative for dizziness, tremors, weakness, numbness and headaches.   Hematological:  Does not bruise/bleed easily.   Psychiatric/Behavioral: Negative.         PHYSICAL EXAM:   /66 (BP Location: Left arm, Patient Position: Sitting, Cuff Size: adult)   Pulse 86   Wt 184 lb (83.5 kg)   LMP 12/31/2024 (Exact Date)  There is no height or weight on file to calculate BMI.  Physical Exam  Vitals and nursing note reviewed.   Constitutional:       General: She is not in acute distress.     Appearance: Normal appearance. She is normal weight. She is not ill-appearing, toxic-appearing or diaphoretic.   HENT:      Head: Normocephalic.      Nose: Nose normal.   Eyes:      Extraocular Movements: Extraocular movements intact.      Conjunctiva/sclera: Conjunctivae normal.   Cardiovascular:      Pulses: Normal pulses.   Pulmonary:      Effort: Pulmonary effort is normal.   Musculoskeletal:         General: Swelling and tenderness present. No deformity (No joint or spinal deformity) or signs of injury.      Cervical back: Normal range of motion and neck supple. No rigidity.      Right lower leg: No edema.      Left lower leg: No edema.      Comments: Mild to moderate swelling left lateral ankle, tenderness left lateral ankle and lower leg  Mild left lower back posterior hip muscle spasm/tenderness  No significant bruising, no erythema   Lymphadenopathy:      Cervical: No cervical adenopathy.   Skin:     General: Skin is warm and dry.      Capillary Refill: Capillary refill takes less than 2 seconds.   Neurological:      General: No focal deficit present.      Mental Status: She is alert and oriented to person, place, and time.      Cranial Nerves: No cranial nerve deficit.      Motor: No weakness.      Gait: Gait normal.      Deep Tendon Reflexes: Reflexes normal.   Psychiatric:         Mood and Affect: Mood normal.         Behavior:  Behavior normal.         Thought Content: Thought content normal.         Judgment: Judgment normal.         ASSESSMENT AND PLAN:   1. Patient is a 49 year old female who presents for:     1. Fall, subsequent encounter, no head injury or loss of consciousness, no acute distress, no neurological deficits on exam  Patient with subsequent complaints and findings below  Evaluation in ER including multiple x-rays (reviewed today) negative for fracture or acute osseous injury  We will check x-ray left [id number] pending results  Diclofenac as prescribed below  Recommend relative rest, no prolonged activity including prolonged driving for the next week  Recommend orthopedic evaluation and treatment for the below complaints  - ORTHOPEDIC - INTERNAL; Future  - XR TIBIA + FIBULA (2 VIEWS), LEFT (CPT=73590); Future    2. Sprain of other ligament of left ankle, subsequent encounter  - ORTHOPEDIC - INTERNAL; Future  - diclofenac 75 MG Oral Tab EC; Take 1 tablet (75 mg total) by mouth 2 (two) times daily. X 3-4 days then prn, take with food, stop with heartburn or side effects occur  Dispense: 60 tablet; Refill: 0    3. Pain in left lower leg  - XR TIBIA + FIBULA (2 VIEWS), LEFT (CPT=73590); Future  - diclofenac 75 MG Oral Tab EC; Take 1 tablet (75 mg total) by mouth 2 (two) times daily. X 3-4 days then prn, take with food, stop with heartburn or side effects occur  Dispense: 60 tablet; Refill: 0    4. Pain of left hip  - diclofenac 75 MG Oral Tab EC; Take 1 tablet (75 mg total) by mouth 2 (two) times daily. X 3-4 days then prn, take with food, stop with heartburn or side effects occur  Dispense: 60 tablet; Refill: 0    5. Pain in left elbow    Patient (and/or guardian or parent if less than 18 years old) was given instructions regarding diagnosis, management, expectations and follow up.    Patient Instructions   Will contact you/patient when the test(s), XRay, result(s) are final and with further recommendations then if  any changes.     Recommend:    Relative rest, gentle range of motion exercises.    Medication(s), Voltaren oral anti-inflammatory, as prescribed.    Follow-up with the orthopedist as soon as an appointment is available.    Follow-up with me or your PCP in the next 1 to 2 weeks or sooner as needed.      Go to the emergency room or call 911 for any new or suddenly worsening symptoms, any signs of acute distress, respiratory stress or emergent changes.      (See AVS)    All questions were answered.  We discussed the indications, proper use, risks, and benefits of the above recommendations including any medication(s) as prescribed.  The patient's parent (or guardian) indicate(s) understanding and agree(s) to the above plan of care.    No orders of the defined types were placed in this encounter.    Meds & Refills for this Visit:  Requested Prescriptions     Signed Prescriptions Disp Refills    diclofenac 75 MG Oral Tab EC 60 tablet 0     Sig: Take 1 tablet (75 mg total) by mouth 2 (two) times daily. X 3-4 days then prn, take with food, stop with heartburn or side effects occur     Other Orders, Imaging & Consults:  ORTHOPEDIC - INTERNAL    Cammy Capps MD          [1]   Allergies  Allergen Reactions    Bactrim [Sulfamethoxazole W/Trimethoprim] HIVES and PALPITATIONS    Penicillins ANAPHYLAXIS and HIVES    Radiology Contrast Iodinated Dyes PALPITATIONS     Premedicated before CT contrast    Latex RASH

## 2025-01-02 NOTE — PATIENT INSTRUCTIONS
Will contact you/patient when the test(s), XRay, result(s) are final and with further recommendations then if any changes.     Recommend:    Relative rest, gentle range of motion exercises.    Medication(s), Voltaren oral anti-inflammatory, as prescribed.    Follow-up with the orthopedist as soon as an appointment is available.    Follow-up with me or your PCP in the next 1 to 2 weeks or sooner as needed.      Go to the emergency room or call 911 for any new or suddenly worsening symptoms, any signs of acute distress, respiratory stress or emergent changes.

## 2025-01-03 ENCOUNTER — TELEPHONE (OUTPATIENT)
Dept: ORTHOPEDICS CLINIC | Facility: CLINIC | Age: 50
End: 2025-01-03

## 2025-01-03 ENCOUNTER — TELEPHONE (OUTPATIENT)
Facility: CLINIC | Age: 50
End: 2025-01-03

## 2025-01-03 NOTE — TELEPHONE ENCOUNTER
Patient called to schedule UC f/u with Gissel for the left elbow and wrist. Please advise if imaging is needed  Future Appointments   Date Time Provider Department Center   1/8/2025  9:00 AM Gissel Garcia PA [facility] [place]   1/10/2025 10:40 AM Jonathan Birmingham,  [facility] [address]

## 2025-01-03 NOTE — TELEPHONE ENCOUNTER
Patient called to schedule a follow up after UC for  for left hip, knee and sprained ankle. Please advise if imaging is needed  Future Appointments   Date Time Provider Department Center   1/8/2025  9:00 AM Gissel Garcia PA [facility] [place]   1/10/2025 10:40 AM Jonathan Birmingham DO [facility] [address]

## 2025-01-03 NOTE — TELEPHONE ENCOUNTER
Patient left voicemail to schedule an appointment with provider.    Attempted to reach patient to assist.    Please ask if this is for left knee or left elbow.

## 2025-01-06 NOTE — PROGRESS NOTES
Gatito Maradiaga,    Attached are the results of your recently performed labs/tests:    All results are essentially within NORMAL limits-no fracture was seen.    Take care,     Cammy Capps MD  1/6/2025    (Report(s) are attached, future lab/test orders or any referrals are in your chart/MyChart or will be mailed to you)

## 2025-01-10 ENCOUNTER — OFFICE VISIT (OUTPATIENT)
Facility: CLINIC | Age: 50
End: 2025-01-10
Payer: COMMERCIAL

## 2025-01-10 ENCOUNTER — HOSPITAL ENCOUNTER (OUTPATIENT)
Dept: MRI IMAGING | Facility: HOSPITAL | Age: 50
Discharge: HOME | End: 2025-01-10
Attending: FAMILY MEDICINE
Payer: COMMERCIAL

## 2025-01-10 VITALS — WEIGHT: 184 LBS

## 2025-01-10 DIAGNOSIS — T07.XXXA MULTIPLE CONTUSIONS: ICD-10-CM

## 2025-01-10 DIAGNOSIS — M24.172 DERANGEMENT OF ANKLE, LEFT: Primary | ICD-10-CM

## 2025-01-10 DIAGNOSIS — S93.492A HIGH ANKLE SPRAIN, LEFT, INITIAL ENCOUNTER: ICD-10-CM

## 2025-01-10 DIAGNOSIS — S93.492A SPRAIN OF ANTERIOR TALOFIBULAR LIGAMENT OF LEFT ANKLE, INITIAL ENCOUNTER: ICD-10-CM

## 2025-01-10 DIAGNOSIS — M24.172 DERANGEMENT OF ANKLE, LEFT: ICD-10-CM

## 2025-01-10 PROCEDURE — 99204 OFFICE O/P NEW MOD 45 MIN: CPT | Performed by: FAMILY MEDICINE

## 2025-01-10 PROCEDURE — 73721 MRI JNT OF LWR EXTRE W/O DYE: CPT | Performed by: FAMILY MEDICINE

## 2025-01-13 NOTE — H&P
Sports Medicine Clinic Note    Subjective:    Chief Complaint: Left ankle pain.    Date of Injury: 12/27/2024.    History: 49-year-old female presenting with primarily left ankle pain following a fall on 12/27/2024. The patient slipped on a wet marble floor at the [facility] and fell onto her left side. She reported pain in the left elbow, wrist, hip, knee, and ankle following the incident. Radiographs performed in the emergency department did not reveal any fractures in the affected areas, but the patient reports persistent, localized pain over the left ankle. She is unable to bear full weight without significant pain. No numbness, tingling, or instability reported. Denies prior injuries to the left ankle or lower extremity.    Objective:    Left Ankle Examination:    Inspection: Moderate swelling over the lateral malleolus. No visible erythema, ecchymosis, or deformity.  Palpation: Tenderness localized to the lateral malleolus. No tenderness over the medial malleolus, Achilles tendon, or anterior ankle joint.  Range of Motion: Limited dorsiflexion and plantarflexion secondary to pain. Inversion and eversion provoke lateral ankle pain.  Neurovascular: Sensation intact to light touch over the superficial peroneal, deep peroneal, tibial, and sural nerve distributions. Dorsalis pedis and posterior tibial pulses palpable. Capillary refill <2 seconds.  Special Tests: Unable to perform talar tilt and anterior drawer tests due to patient guarding and significant pain. No gross instability noted on examination.    Diagnostic Tests:    XR TIBIA + FIBULA (2 VIEWS), LEFT (1/2/2025): Normal examination. No significant arthropathy, fracture, or acute abnormality noted. No visible soft tissue swelling or effusion.  XR ANKLE (MIN 3 VIEWS), LEFT (12/28/2024): No acute osseous abnormality. Soft tissue inflammation noted over the lateral malleolus consistent with soft tissue injury or sprain.  XR ELBOW, WRIST, HIP, KNEE (12/28/2024): No  acute fractures or significant abnormalities noted in the left elbow, wrist, hip, or knee.    Assessment:    Suspected left ankle lateral ligament sprain with associated soft tissue injury and inflammation.  Soft tissue contusion of the left knee, hip, elbow, and wrist (improving since initial injury).    Plan:    Additional Workup: MRI of the left ankle to assess for ligamentous injury or soft tissue damage, including evaluation of the anterior talofibular ligament, calcaneofibular ligament, and potential peroneal tendon involvement as well as other internal derangement.  Therapy: Recommend initiation of physical therapy to address pain, swelling, and improve ankle stability once pain subsides. Focus on range-of-motion exercises, strengthening, and proprioception.  Medications: NSAIDs for pain and inflammation.  Bracing/Casting: Provide an ankle brace for support and to limit inversion/eversion. Recommend use during ambulation.  Procedures: None indicated at this time.  Activity Recommendations: Advise protected weight-bearing with crutches and brace to pain tolerance until MRI is completed. Encourage icing the ankle for 15-20 minutes, 3-4 times daily, and elevation to minimize swelling. Avoid activities that exacerbate pain.    Follow-Up: Tentatively scheduled following MRI of the left ankle to reassess and update the treatment plan. Patient instructed to return sooner if symptoms worsen, significant swelling develops, or new areas of pain arise.      Jonathan Birmingham DO, CAQSM   Primary Care Sports Medicine

## 2025-01-14 ENCOUNTER — PATIENT MESSAGE (OUTPATIENT)
Dept: ORTHOPEDICS CLINIC | Facility: CLINIC | Age: 50
End: 2025-01-14

## 2025-01-14 NOTE — TELEPHONE ENCOUNTER
LM with [facility] radiology to call back regarding this report not yet released. Called  MRI at [phone] and was advised radiologist is reading it now.

## 2025-01-15 ENCOUNTER — OFFICE VISIT (OUTPATIENT)
Dept: ORTHOPEDICS CLINIC | Facility: CLINIC | Age: 50
End: 2025-01-15
Payer: COMMERCIAL

## 2025-01-15 ENCOUNTER — OFFICE VISIT (OUTPATIENT)
Facility: CLINIC | Age: 50
End: 2025-01-15
Payer: COMMERCIAL

## 2025-01-15 VITALS — WEIGHT: 184 LBS

## 2025-01-15 VITALS — HEIGHT: 65 IN | BODY MASS INDEX: 30.66 KG/M2 | WEIGHT: 184 LBS

## 2025-01-15 DIAGNOSIS — M25.472 EDEMA OF LEFT ANKLE: ICD-10-CM

## 2025-01-15 DIAGNOSIS — S63.502A WRIST SPRAIN, LEFT, INITIAL ENCOUNTER: Primary | ICD-10-CM

## 2025-01-15 DIAGNOSIS — S93.432D SPRAIN OF TIBIOFIBULAR LIGAMENT OF LEFT ANKLE, SUBSEQUENT ENCOUNTER: ICD-10-CM

## 2025-01-15 DIAGNOSIS — M65.979 PERONEAL TENOSYNOVITIS: ICD-10-CM

## 2025-01-15 DIAGNOSIS — M25.522 ELBOW PAIN, LEFT: ICD-10-CM

## 2025-01-15 DIAGNOSIS — S93.492D SPRAIN OF ANTERIOR TALOFIBULAR LIGAMENT OF LEFT ANKLE, SUBSEQUENT ENCOUNTER: Primary | ICD-10-CM

## 2025-01-15 PROCEDURE — 99203 OFFICE O/P NEW LOW 30 MIN: CPT | Performed by: PHYSICIAN ASSISTANT

## 2025-01-15 PROCEDURE — 99214 OFFICE O/P EST MOD 30 MIN: CPT | Performed by: FAMILY MEDICINE

## 2025-01-15 NOTE — PROGRESS NOTES
Sports Medicine Clinic Note    Subjective:    Current Visit Date: 1/15/2025    Chief Complaint: Persistent left ankle pain.    Date of Injury: 12/27/2024.    History: 49-year-old female returning for follow-up evaluation of left ankle pain. Since the last visit, she reports no significant improvement despite protected weight-bearing with crutches and brace use. Pain continues to be localized to the lateral ankle with occasional radiating discomfort along the lateral foot. She notes increased pain during ambulation and inversion movements. No new areas of pain, instability, or neurological symptoms reported. She has been compliant with icing and elevation as previously instructed. MRI results reviewed during today’s visit.    Objective:    Left Ankle Examination:    Inspection: Persistent moderate swelling over the lateral malleolus. No erythema, ecchymosis, or deformity observed.  Palpation: Tenderness over the anterior talofibular ligament and along the peroneal tendons. No tenderness over the medial malleolus, Achilles tendon, or anterior ankle joint.  Range of Motion: Dorsiflexion to 5 degrees, plantarflexion to 20 degrees, inversion and eversion limited secondary to pain.  Neurovascular: Sensation intact to light touch over the superficial peroneal, deep peroneal, tibial, and sural nerve distributions. Dorsalis pedis and posterior tibial pulses palpable. Capillary refill <2 seconds.  Special Tests: Positive anterior drawer test with soft endpoint. Talar tilt test limited secondary to pain. No gross instability noted.    Diagnostic Tests:    MRI ANKLE, LEFT (1/14/2025):  Remote partial-thickness tear of the anterior talofibular ligament.  Partial-thickness tearing of the anterior and posterior tibiofibular ligaments with edema and fluid within the syndesmosis (high ankle sprain).  Split tear of the peroneus brevis with peroneal tenosynovitis.  Small tibiotalar and talonavicular joint effusion.  Soft tissue edema  throughout the ankle without mass or loculated collection.    Assessment:    Left ankle high ankle sprain with partial-thickness tearing of the anterior and posterior tibiofibular ligaments and associated syndesmotic edema.  Split tear of the left peroneus brevis tendon with peroneal tenosynovitis.  Remote partial-thickness tear of the anterior talofibular ligament.  Small joint effusion and soft tissue edema of the left ankle.    Plan:    Additional Workup: No further imaging is indicated at this time.  Therapy: Continue physical therapy focusing on reducing pain, restoring range of motion, and addressing syndesmotic instability. Initiate strengthening exercises for ankle stabilizers and proprioceptive training. Consider manual therapy techniques to address soft tissue adhesions and improve peroneal tendon mobility.  Medications: NSAIDs as needed for pain and inflammation.  Bracing/Casting: Continue use of an ankle brace to stabilize the joint and limit inversion/eversion during ambulation. Transition to a walking boot if pain persists with brace use.  Procedures: None at this time. Consider ultrasound-guided corticosteroid injection for peroneal tenosynovitis if pain and inflammation remain significant after 4 weeks of therapy.  Activity Recommendations: Continue protected weight-bearing with crutches and brace as needed. Gradually progress to full weight-bearing as pain and strength allow.    Follow-Up: Tentatively scheduled in 4-6 weeks to reassess progress with physical therapy and symptom management. Return sooner if significant worsening of symptoms or instability develops.      Jonathan Birmingham DO, CAM   Primary Care Sports Medicine

## 2025-01-15 NOTE — H&P
Clinic Note [facility]     Assessment/Plan:  49 year old female    Left elbow pain-  her pain is diffuse both lateral medial dorsal and volar.  I like to order an MRI for further evaluation.  Left wrist pain dorsally likely sprain-will give her a wrist brace for the next 4 weeks of that with some of her symptoms.  I suspect she sprained the wrist.      ICD-10-CM    1. Wrist sprain, left, initial encounter  S63.502A       2. Elbow pain, left  M25.522            Follow Up: After    Diagnostic Studies:  X-rays are negative for acute fractures occasional deformity    Physical Exam:    Ht 5' 5\" (1.651 m)   Wt 184 lb (83.5 kg)   LMP 12/31/2024 (Exact Date)   BMI 30.62 kg/m²     Constitutional: NAD. AOx3. Well-developed and Well-nourished.   Psychiatric: Normal mood/ affect/ behavior. Judgment and thought content normal.     Left upper Extremity:   Inspection: Skin Intact. No skin lesions. No gross deformity.   Palpation: Tender diffusely along the elbow including lateral medial epicondyle, cubital tunnel, radiocapitellar joint, biceps, triceps mildly tender over the dorsal SL interval.     Motion: Elbow: normal bilateral symmetric ext/flex  Wrist: normal bilateral symmetric ext/flex/sup/pro  Finger: full composite fist       CC: Other (LT ELBOW & WRIST; DOI: 12/28/24; FELL AT THE MALL )        HPI: This 49 year old female presents with complaints of left elbow and wrist pain.  [place] on 12[place]28[place]2024.  She fell on her entire left side.  She rates the pain severe.  She describes it as throbbing aching sharp and burning.  Her wrist pain is gotten slightly better she continues to have significant elbow pain she has not had any significant treatment thus far.    @[email or web address]@      History reviewed. No pertinent past medical history.    History reviewed. No pertinent surgical history.    Current Outpatient Medications   Medication Sig Dispense Refill    famotidine 20 MG Oral Tab Take 1 tablet (20 mg  total) by mouth 2 (two) times daily.      meclizine 25 MG Oral Tab Take 1 tablet (25 mg total) by mouth 3 (three) times daily as needed.      hydrOXYzine 25 MG Oral Tab Take 1 tablet (25 mg total) by mouth every 8 (eight) hours as needed.      diclofenac 75 MG Oral Tab EC Take 1 tablet (75 mg total) by mouth 2 (two) times daily. X 3-4 days then prn, take with food, stop with heartburn or side effects occur 60 tablet 0       Allergies[1]    History reviewed. No pertinent family history.    Social History     Occupational History    Not on file   Tobacco Use    Smoking status: Never    Smokeless tobacco: Never   Vaping Use    Vaping status: Never Used   Substance and Sexual Activity    Alcohol use: Yes     Comment: Occasional    Drug use: Never    Sexual activity: Not on file        Review of Systems (negative unless bolded):  General: fevers, chills, fatigue  CV:  chest pain, palpitations, leg swelling  Msk: bodyaches, neck pain, neck stiffness  Skin: rashes, open wounds, nonhealing ulcers  Hem: bleeds easily, bruise easily, immunocompromised  Neuro: dizziness, light headedness, headaches  Psych: anxious, depressed, anger issues  Gissel Garcia PA-C  Hand, Wrist, & Elbow Surgery  Physician Assistant to Dr. [email or web address] [email or web address]  t: [phone]  f: [phone]         [1]   Allergies  Allergen Reactions    Bactrim [Sulfamethoxazole W/Trimethoprim] HIVES and PALPITATIONS    Penicillins ANAPHYLAXIS and HIVES    Radiology Contrast Iodinated Dyes PALPITATIONS     Premedicated before CT contrast    Latex RASH

## 2025-01-16 ENCOUNTER — TELEPHONE (OUTPATIENT)
Facility: CLINIC | Age: 50
End: 2025-01-16

## 2025-01-16 NOTE — TELEPHONE ENCOUNTER
Short term disability forms received via [employer] message on 1/14/25. [employer] message sent for authorization. Logged for processing.

## 2025-01-17 ENCOUNTER — TELEPHONE (OUTPATIENT)
Facility: CLINIC | Age: 50
End: 2025-01-17

## 2025-01-17 NOTE — TELEPHONE ENCOUNTER
[facility] and sent to forms dept  Future Appointments  2/6/2025   12:15 PM   Carlos Fernandes PT              [facility] [place]  2/11/2025  11:30 AM   Carlos FernandesD PT              [facility] [place]  2/13/2025  12:15 PM   Carlos Fernandes          HND PT              [facility] [place]  2/18/2025  8:30 AM    Carlos Fernandes          HND PT              [facility] [place]  2/20/2025  8:30 AM    Carlos Fernandes          HND PT              [facility] [place]  2/25/2025  8:15 AM    Carlos Fernandes          HND PT              [facility] [place]

## 2025-01-17 NOTE — TELEPHONE ENCOUNTER
Short term disability paperwork scanned and sent to forms dept  Future Appointments  2/6/2025   12:15 PM   Carlos Fernandes PT              [facility] [place]  2/11/2025  11:30 AM   Carlos Fernandes PT              [facility] [place]  2/13/2025  12:15 PM   Carlos Fernandes PT              [facility] [place]  2/18/2025  8:30 AM    Carlos Fernandes PT              [facility] [place]  2/20/2025  8:30 AM    Carlos Fernandes PT              [facility] [place]  2/25/2025  8:15 AM    Carlos FernandesD PT              [facility] [place]

## 2025-01-21 NOTE — TELEPHONE ENCOUNTER
Patient called for status, forms are due today 1/21/25. Sending to [employer] rep [place]. Patient wants forms uploaded to [employer] once complete, due to Release of Information being invalid (section 4 wrong). Offered delay letter, patient accepted, sent delay letter via [employer].

## 2025-01-21 NOTE — TELEPHONE ENCOUNTER
Good porsha Birmingham,    Patient is requesting continuous Family Medical Leave Act for  injury, left side of body, ankle, physical therapy. Patient is requesting a leave start date of 01/02/25 and end date 02/25/25 pending re-eval after physical therapy ends.    Do you support?    Thank you   José Miguel

## 2025-01-21 NOTE — TELEPHONE ENCOUNTER
Type of Leave: disability  Reason for Leave: injury, left side of body, ankle, physical therapy  Start date of leave: 1/2/25  End date of leave: 2/25/25 pending re-eval after therapy ends

## 2025-01-22 NOTE — TELEPHONE ENCOUNTER
Dr. Birmingham    Please sign off on form if you agree to: Disability   Start: 1/2/25  End: 2/25/25 pending re-eval after physical therapy    -Signature page will be the first page scanned  -From your Inbasket, Highlight the patient and click Chart   -Double click the 1/16/25 Forms Completion telephone encounter  -Scroll down to the Media section   -Click the blue Hyperlink: disability  Dr. Birmingham  1/22/25  -Click Acknowledge located in the top right ribbon/menu   -Drag the mouse into the blank space of the document and a + sign will appear. Left click to   electronically sign the document.  -Once signed, simply exit out of the screen and you signature will be saved.     Thank you,    Erik KAYE

## 2025-02-05 ENCOUNTER — TELEPHONE (OUTPATIENT)
Dept: PHYSICAL THERAPY | Facility: HOSPITAL | Age: 50
End: 2025-02-05

## 2025-02-06 ENCOUNTER — OFFICE VISIT (OUTPATIENT)
Dept: PHYSICAL THERAPY | Age: 50
End: 2025-02-06
Attending: FAMILY MEDICINE
Payer: COMMERCIAL

## 2025-02-06 DIAGNOSIS — M24.172 DERANGEMENT OF ANKLE, LEFT: Primary | ICD-10-CM

## 2025-02-06 PROCEDURE — 97161 PT EVAL LOW COMPLEX 20 MIN: CPT | Performed by: PHYSICAL THERAPIST

## 2025-02-06 PROCEDURE — 97110 THERAPEUTIC EXERCISES: CPT | Performed by: PHYSICAL THERAPIST

## 2025-02-08 NOTE — PROGRESS NOTES
LOWER EXTREMITY EVALUATION:     Diagnosis:   Derangement of ankle, left (M24.172) Patient:  Sergei Maradiaga (49 year old, female)        Referring Provider: Jonathan Birmingham  Today's Date   2/7/2025    Precautions:  None   Date of Evaluation: 02/06/25  Next MD visit: NA  Date of Surgery: NA     PATIENT SUMMARY   Summary of chief complaints: Constant (L) anterior lower leg/ankle pain/ache, numbness/tingling rated 7-8-9/10.  History of current condition: Huyen report that last 12/28/2024 she slipped and fell in a mall.  She then felt pain/ache in the (L) lower leg/ankle area.   Pain level: current 8 /10, at best 7 /10, at worst 9 /10  Description of symptoms: Constant (L) anterior lower leg/ankle pain/ache, numbness/tingling rated 7-8-9/10.   Occupation: Sit at work; own cleaning business   Leisure activities/Hobbies: walk for 20 mins 3x/week, home exercise, spend time and activtieis with 13 y/o child, read sitting on \"glider\" chair.  Home chores, cleaning; 3 floors at home full flight of stairs with (L) side rail on the way up.  Drives a car 1.5 hours to work, do errands, and activities.   Prior level of function: No symptom in the (L) Ankle/lower leg with all activities.  Current limitations: Inability to walk, climb (down>up) stairs, drive, get in/out of her car, step up/over tub, lay down her sides, and do home chores without producing or increasing symptoms in the (L) lower leg/ankle.  Pt goals: Return to all activities without symptoms in the (L) Ankle/lower leg.  Past medical history was reviewed with Sergei.  Significant findings include: NA  Imaging/Tests: x-ray ((-) fracture); MRI (high ankle sprain)   Sergei  has no past medical history on file.  She  has no past surgical history on file.    ASSESSMENT  Sergei presents to physical therapy evaluation with primary c/o Constant (L) anterior lower leg/ankle pain/ache, numbness/tingling rated 7-8-9/10.. The results of the objective tests and measures show  Huyen is presenting with a provisional (L) ankle derangement with a directional preference toward unloded (L) ankle DF with towel guide/overpressure.  She was able to ambulate with decreased (L) LE limp without using a (L) ankle support/brace.. Functional deficits include but are not limited to Inability to walk, climb (down>up) stairs, drive, get in/out of her car, step up/over tub, lay down her sides, and do home chores without producing or increasing symptoms in the (L) lower leg/ankle.. Signs and symptoms are consistent with diagnosis of Derangement of ankle, left (M24.172). Pt and PT discussed evaluation findings, pathology, POC and HEP.  Pt voiced understanding and performs HEP correctly without reported pain. Skilled Physical Therapy is medically necessary to address the above impairments and reach functional goals.    OBJECTIVE:   Musculoskeletal  Observation: (L) Ankle/Foot with a laced up ankle brace/support  Palpation: (+) Tenderness at (L) lower leg and lateral ankle   Accessory Motion:       Edema: Malleolar level: (L) 28.5 cm;  (R) 28.5 cm     ROM/MMT ((*) ERP and pain upon resistance)  Ankle/Foot   ROM MMT    R L R L     PF 60 degs 53 degs * 4/5 3+/5 *     DF (L4) 7 degs - 21 degs * 4/5 3+/5 *     Inversion 40 degs 22 degs * 4-/5 3-/5     Eversion 19 degs 10 degs * 4-/5 3-/5            Neurological:  Sensation: (+) Numbness/Tingling (L) Ankle/lower leg     Balance and Functional Mobility:  Gait: pt ambulates on level ground with decreased step length; decreased stance phase; decreased foot clearance; other (use comment) ((L) LE/Foot/Ankle antalgic gait; decreased heel-toe and push-off pattern)     Today's Treatment and Response:   Pt education was provided on exam findings, treatment diagnosis, treatment plan, expectations, and prognosis.  Today's Treatment       2/6/2025   PT Treatment   Therapeutic Exercise Seated: (L) Ankle DF with towel OP  x 10 reps; P, NW (better)    - less pain walking    + 10  more reps; P, NW (better)    - improve gait pattern with heel-toe and push-off    Therapeutic Exercise Min 10   Total of Timed Procedures 10   Total of Service Based 0   Total Treatment Time 46         Patient was instructed in and issued a HEP for: Seated: (L) Ankle DF mobs with towel OP x 10 reps 4-5x/day    Charges:  PT EVAL: Low Complexity, TherEx x 1  In agreement with evaluation findings and clinical rationale, this evaluation involved MODERATE COMPLEXITY decision making due to 1-2 personal factors/comorbidities, 3 or more body structures involved/activity limitations, and evolving symptoms as documented in the evaluation.                                                                                                                PLAN OF CARE:    Goals: (to be met in 12 visits)   Goals       Therapy Goals     1. Patient to consistently perform their HEP and it's progression to maintain their improved condition.  2. Patient to have reduced and abolished symptoms to to be able to Inability to walk, climb (down>up) stairs, drive, get in/out of her car, step up/over tub, lay down her sides, and do home chores without producing or increasing symptoms in the (L) lower leg/ankle.   3. Patient to have WNL of (L) Shoulder AROM in all directions with 4/5 MMT in all motions to promote all home, work, recreational, and functional activities without discomfort or deviation.               Frequency / Duration: Patient will be seen 2-1x/week or a total of 12  visits over a 90 day period. Treatment will include: Gait training; Manual Therapy; Therapeutic Activities; Therapeutic Exercise; Home Exercise Program instruction; Other (use comment) (Strengthening/stability, proprioceptive exercises, patient education, and HEP progression.)    Education or treatment limitation: None   Rehab Potential: good     LEFS Score  LEFS Score: (Patient-Rptd) 5 % (2/6/2025  9:40 AM)      Patient/Family/Caregiver was advised of these findings,  precautions, and treatment options and has agreed to actively participate in planning and for this course of care.    Thank you for your referral. Please co-sign or sign and return this letter via fax as soon as possible to [phone]. If you have any questions, please contact me at Dept: [phone]    Sincerely,  Electronically signed by therapist: Carlos Fernandes, PT, Cert MDT  Physician's certification required: Yes  I certify the need for these services furnished under this plan of treatment and while under my care.    X___________________________________________________ Date____________________    Certification From: 2/7/2025  To:5/8/2025

## 2025-02-11 ENCOUNTER — OFFICE VISIT (OUTPATIENT)
Dept: PHYSICAL THERAPY | Age: 50
End: 2025-02-11
Attending: FAMILY MEDICINE
Payer: COMMERCIAL

## 2025-02-11 PROCEDURE — 97110 THERAPEUTIC EXERCISES: CPT | Performed by: PHYSICAL THERAPIST

## 2025-02-11 NOTE — PROGRESS NOTES
Patient: Sergei Maradiaga (49 year old, female) Referring Provider:  Insurance:   Diagnosis: Derangement of ankle, left (M24.172) Jonathan [facility] [employer]   Date of Surgery: NA Next MD visit:  N/A   Precautions:  None NA Referral Information:    Date of Evaluation: Req: 0, Auth: 0, Exp:     02/06/25 POC Auth Visits:  12       Today's Date   2/11/2025    Subjective  Huyen report that she feels an ache/pain in the anterior (L) ankle.  It's a different location than before.  She has been doing her HEP 1-2x/day.       Pain:       Objective  Decreased limp on (L) LE.  Improved heel-toe and push-off pattern.       Assessment  Huyen is progessing with (L) Ankle/LE strengthening exercises but cueing needed to maintain good knee-toe alignment.  She was issued a redTB to progress her HEP.  Reviewed exercises to be performed at home with correct technique.  All questions and concerns were answered and addressed at this time.    Goals (to be met in 12 visits)   Goals       Therapy Goals     1. Patient to consistently perform their HEP and it's progression to maintain their improved condition.  2. Patient to have reduced and abolished symptoms to to be able to Inability to walk, climb (down>up) stairs, drive, get in/out of her car, step up/over tub, lay down her sides, and do home chores without producing or increasing symptoms in the (L) lower leg/ankle.   3. Patient to have WNL of (L) Shoulder AROM in all directions with 4/5 MMT in all motions to promote all home, work, recreational, and functional activities without discomfort or deviation.               Plan  Re-assess next session and continue with (L) Ankle/LE strengthening, stability, stretching, patient education, and HEP progression.    Treatment Last 4 Visits       2/6/2025 2/11/2025   PT Treatment   Treatment Day  2   Therapeutic Exercise Seated: (L) Ankle DF with towel OP  x 10 reps; P, NW (better)    - less pain walking    + 10 more reps; P, NW (better)    - improve  gait pattern with heel-toe and push-off  NuStep LE only L1-3-1 x 10 mins; NE     Seated: (L) Ankle DF mob with towel OP x 10 reps; NE    Seated: (L) Ankle DF, EV, IV, PF with redTB resist x 20 reps ea; P, NW    Seated: (L) Hallux PF with redTB x 20 reps; NE    Seated: BAPS (L) Ankle/Foot L3 PF-DF/EV-IV/CW/CCW x 20 reps; P, NW         Therapeutic Exercise Min 10 41   Total of Timed Procedures 10 41   Total of Service Based 0 0   Total Treatment Time 46 44         HEP  Seated: (L) Ankle DF mobs with towel OP x 10-20 reps 1-2x/day  Seated: (L) Ankle resisted PF, EV, IV, DF with redTB (issued) x 20 reps each 1-2x/day  Seated: (L) Hallux PF with redTB x 20 reps x 1-2x/day    Charges     TherEx x 3

## 2025-02-13 ENCOUNTER — OFFICE VISIT (OUTPATIENT)
Dept: PHYSICAL THERAPY | Age: 50
End: 2025-02-13
Attending: FAMILY MEDICINE
Payer: COMMERCIAL

## 2025-02-13 PROCEDURE — 97110 THERAPEUTIC EXERCISES: CPT | Performed by: PHYSICAL THERAPIST

## 2025-02-13 NOTE — PROGRESS NOTES
Called patient.  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  He says he was having a bad day and just had been awakened.      He reports that blood pressures have been better.  They are in the 140's.  He has not seen any improvement in his symptoms however.  He feels he is watching his salt and has cut down on alcohol.        PLEASE ORDER FULL PFTS DIAGNOSIS COPD   Patient: Sergei Maradiaga (49 year old, female) Referring Provider:  Insurance:   Diagnosis: Derangement of ankle, left (M24.172) Jonathan [employer]   Date of Surgery: NA Next MD visit:  N/A   Precautions:  None NA Referral Information:    Date of Evaluation: Req: 0, Auth: 0, Exp:     02/06/25 POC Auth Visits:  12       Today's Date   2/13/2025    Subjective  Huyen report that she was sore in the (L) ankle/shin after her exercise but after that she is feeling good at this time.  She continue to do her HEP ((L) Ankle DF mobs with towel OP and redTB strengthening) regularly.       Pain: 0/10     Objective  No pain walking without limping.    Assessment  Huyen was progreesed to standing (L) Ankle DF mobs using a towel roll for bias.  She was issued a greenTB to progress eccentric DF load to maintain her improved condiiton.  She state that she is able to walk without a limp and overall about 70% better now.  She was issued a copy of her new HEP.  All questions and concerns were answered at this time.    Goals (to be met in 12 visits)   Goals                Today    Therapy Goals   On track    1. Patient to consistently perform their HEP and it's progression to maintain their improved condition.  2. Patient to have reduced and abolished symptoms to to be able to Inability to walk, climb (down>up) stairs, drive, get in/out of her car, step up/over tub, lay down her sides, and do home chores without producing or increasing symptoms in the (L) lower leg/ankle.   3. Patient to have WNL of (L) Shoulder AROM in all directions with 4/5 MMT in all motions to promote all home, work, recreational, and functional activities without discomfort or deviation.               Plan  Re-assess next session and continue with (L) Ankle/LE strengthening, stability, stretching, patient education, and HEP progression.    Treatment Last 4 Visits       2/6/2025 2/11/2025 2/13/2025   PT Treatment   Treatment Day  2 3   Therapeutic Exercise  Seated: (L) Ankle DF with towel OP  x 10 reps; P, NW (better)    - less pain walking    + 10 more reps; P, NW (better)    - improve gait pattern with heel-toe and push-off  NuStep LE only L1-3-1 x 10 mins; NE     Seated: (L) Ankle DF mob with towel OP x 10 reps; NE    Seated: (L) Ankle DF, EV, IV, PF with redTB resist x 20 reps ea; P, NW    Seated: (L) Hallux PF with redTB x 20 reps; NE    Seated: BAPS (L) Ankle/Foot L3 PF-DF/EV-IV/CW/CCW x 20 reps; P, NW       TM: Retro walk @ 5% grade x 0.6 mph x 10 mins; NE    Seated: (L) Ankle DF mob with towel OP x 10 reps x 10 cts ea; NE (good stretch)    Standing: (L) Ankle DF mob with towel roll bias 2 x 10 reps; NE strong stretch    Seated: (L) Ankle DF eccentric load with greenTB 10 reps x 10 eccen ct ea; NE tired    Step up onto bluefoam with (R) LE foam roll tap x 10 reps; NE    Standing: (L) LE BAPS PF-DF and IV-EV x 20 reps ea; NE tired    Standing: (L) Ankle DF mobs with towel roll x 20 mobs; NE    Seated: (L) Ankle DF eccentric strengthening with greenTB 5 reps x 10 eccen ct ea; NE   Therapeutic Exercise Min 10 41 48   Total of Timed Procedures 10 41 48   Total of Service Based 0 0 0   Total Treatment Time 46 44 49         HEP  Standing: (L) Ankle DF mobs with towel roll x 20 mobs x 2-3x/day    Seated: (L) Ankle DF eccentric strengthening with greenTB 5 reps x 10 eccen ct each x 2-3x/day    Charges     TherEx x 3

## 2025-02-18 ENCOUNTER — OFFICE VISIT (OUTPATIENT)
Dept: PHYSICAL THERAPY | Age: 50
End: 2025-02-18
Attending: FAMILY MEDICINE
Payer: COMMERCIAL

## 2025-02-18 PROCEDURE — 97110 THERAPEUTIC EXERCISES: CPT | Performed by: PHYSICAL THERAPIST

## 2025-02-18 NOTE — PROGRESS NOTES
Patient: Sergei Maradiaga (49 year old, female) Referring Provider:  Insurance:   Diagnosis: Derangement of ankle, left (M24.172) Jonathan [facility] [employer]   Date of Surgery: NA Next MD visit:  N/A   Precautions:  None NA Referral Information:    Date of Evaluation: Req: 0, Auth: 0, Exp:     02/06/25 POC Auth Visits:  12       Today's Date   2/18/2025    Subjective  Huyen report that she is feeling good in the (L) ankle.  There is no pain/ache while she is walking.  She is doing her HEP regularly.  Huyen was 16 mins late today.       Pain: 0/10     Objective  Ache (1/10) in ankle when walking (push-off)      Assessment  Huyen continue to respond do (L) Ankle DF mobs. Her (L) foot ache during push-off abolised after this DP exercise.  She was reminded to perform more strengthening (eccentric) to the (L) ankle/foot to maintain her improved condition.  She felt stretch/ache in the achilles tendon during exercises but no worse as a result.    Goals (to be met in 12 visits)   Goals                2/13/25    Therapy Goals   On track    1. Patient to consistently perform their HEP and it's progression to maintain their improved condition.  2. Patient to have reduced and abolished symptoms to to be able to Inability to walk, climb (down>up) stairs, drive, get in/out of her car, step up/over tub, lay down her sides, and do home chores without producing or increasing symptoms in the (L) lower leg/ankle.   3. Patient to have WNL of (L) Shoulder AROM in all directions with 4/5 MMT in all motions to promote all home, work, recreational, and functional activities without discomfort or deviation.               Plan  Re-assess next session and continue with (L) Ankle/LE strengthening, stability, stretching, patient education, and HEP progression.    Treatment Last 4 Visits       2/6/2025 2/11/2025 2/13/2025 2/18/2025   PT Treatment   Treatment Day  2 3 4   Therapeutic Exercise Seated: (L) Ankle DF with towel OP  x 10 reps; P, NW  (better)    - less pain walking    + 10 more reps; P, NW (better)    - improve gait pattern with heel-toe and push-off  NuStep LE only L1-3-1 x 10 mins; NE     Seated: (L) Ankle DF mob with towel OP x 10 reps; NE    Seated: (L) Ankle DF, EV, IV, PF with redTB resist x 20 reps ea; P, NW    Seated: (L) Hallux PF with redTB x 20 reps; NE    Seated: BAPS (L) Ankle/Foot L3 PF-DF/EV-IV/CW/CCW x 20 reps; P, NW       TM: Retro walk @ 5% grade x 0.6 mph x 10 mins; NE    Seated: (L) Ankle DF mob with towel OP x 10 reps x 10 cts ea; NE (good stretch)    Standing: (L) Ankle DF mob with towel roll bias 2 x 10 reps; NE strong stretch    Seated: (L) Ankle DF eccentric load with greenTB 10 reps x 10 eccen ct ea; NE tired    Step up onto bluefoam with (R) LE foam roll tap x 10 reps; NE    Standing: (L) LE BAPS PF-DF and IV-EV x 20 reps ea; NE tired    Standing: (L) Ankle DF mobs with towel roll x 20 mobs; NE    Seated: (L) Ankle DF eccentric strengthening with greenTB 5 reps x 10 eccen ct ea; NE TM: Retro walk @ 10% grade x 1.0 mph x 10 mins; NE    (L) Ankle DF mobs with wedge 2 x 10 reps; P, NW achilles tendon pull/ache    - no ache (L) foot during push-off (ambulation)    Seated: (L) Ankle eccentric DF load with blackTB 20 reps x 10 eccen ct each; NE    Step standing: (L) eccen ct DF on 4 inch step 2 x 5 reps x 5 eccen ct ea; NE tired calf    (B) Calf stretch on inclined step L4 3 x 30 secs ea; NE stretch (relief)    Step up onto a bluefoam with (L) and (R) LE foam roll tap x 10 reps; NE tired    (L) Ankle DF mobs with wedge 2 x 10 reps; P, NW achilles tendon pull/ache        Therapeutic Exercise Min 10 41 48 36   Total of Timed Procedures 10 41 48 36   Total of Service Based 0 0 0 0   Total Treatment Time 46 44 49 36         HEP  (L) Ankle DF mobs with towel roll  x10 reps x 1-2x/day    (L) Ankle DF eccentric strengthening with blackTB 20 reps x 10 eccen ct ea x 1-2x/day    Charges     TherEx x 2

## 2025-02-20 ENCOUNTER — OFFICE VISIT (OUTPATIENT)
Dept: PHYSICAL THERAPY | Age: 50
End: 2025-02-20
Attending: FAMILY MEDICINE
Payer: COMMERCIAL

## 2025-02-20 PROCEDURE — 97110 THERAPEUTIC EXERCISES: CPT | Performed by: PHYSICAL THERAPIST

## 2025-02-20 NOTE — PROGRESS NOTES
Patient: Sergei Maradiaga (49 year old, female) Referring Provider:  Insurance:   Diagnosis: Derangement of ankle, left (M24.172) Jonathan Mcclendonkristian [employer] [employer]   Date of Surgery: NA Next MD visit:  N/A   Precautions:  None NA Referral Information:    Date of Evaluation: Req: 0, Auth: 0, Exp:     02/06/25 POC Auth Visits:  12       Today's Date   2/20/2025    Subjective  Huyen report that she is feeling better today in the (L) ankle.  She felt sore in the (L) foot/ankle after her last session but after a day she felt better. She continue to do  her HEP ((L) Ankle DF mobs using a towel roll) regualrly.       Pain: 0/10     Objective  (L) Ankle/Foot: WNL of AROM with 4-/5 MMT in all directions.  No pain/ache walking.  Ambulatory wihtout a limp and without an AD.     Assessment  Huyen continue to respond to (L) Ankle DF directiona preference exercise.  She remained symptomfree in the (L) Ankle but was fatigued/tired after her session.  All questions and concerns werea answered at this time.    Goals (to be met in 12 visits)   Goals                Today    2/13/25    Therapy Goals   On track  On track    1. Patient to consistently perform their HEP and it's progression to maintain their improved condition.  2. Patient to have reduced and abolished symptoms to to be able to Inability to walk, climb (down>up) stairs, drive, get in/out of her car, step up/over tub, lay down her sides, and do home chores without producing or increasing symptoms in the (L) lower leg/ankle.   3. Patient to have WNL of (L) Shoulder AROM in all directions with 4/5 MMT in all motions to promote all home, work, recreational, and functional activities without discomfort or deviation.               Plan  Re-assess next session and continue with (L) Ankle/LE strengthening, stability, stretching, patient education, and HEP progression.    Treatment Last 4 Visits       2/11/2025 2/13/2025 2/18/2025 2/20/2025   PT Treatment   Treatment Day 2 3 4 5   Therapeutic  Exercise NuStep LE only L1-3-1 x 10 mins; NE     Seated: (L) Ankle DF mob with towel OP x 10 reps; NE    Seated: (L) Ankle DF, EV, IV, PF with redTB resist x 20 reps ea; P, NW    Seated: (L) Hallux PF with redTB x 20 reps; NE    Seated: BAPS (L) Ankle/Foot L3 PF-DF/EV-IV/CW/CCW x 20 reps; P, NW       TM: Retro walk @ 5% grade x 0.6 mph x 10 mins; NE    Seated: (L) Ankle DF mob with towel OP x 10 reps x 10 cts ea; NE (good stretch)    Standing: (L) Ankle DF mob with towel roll bias 2 x 10 reps; NE strong stretch    Seated: (L) Ankle DF eccentric load with greenTB 10 reps x 10 eccen ct ea; NE tired    Step up onto bluefoam with (R) LE foam roll tap x 10 reps; NE    Standing: (L) LE BAPS PF-DF and IV-EV x 20 reps ea; NE tired    Standing: (L) Ankle DF mobs with towel roll x 20 mobs; NE    Seated: (L) Ankle DF eccentric strengthening with greenTB 5 reps x 10 eccen ct ea; NE TM: Retro walk @ 10% grade x 1.0 mph x 10 mins; NE    (L) Ankle DF mobs with wedge 2 x 10 reps; P, NW achilles tendon pull/ache    - no ache (L) foot during push-off (ambulation)    Seated: (L) Ankle eccentric DF load with blackTB 20 reps x 10 eccen ct each; NE    Step standing: (L) eccen ct DF on 4 inch step 2 x 5 reps x 5 eccen ct ea; NE tired calf    (B) Calf stretch on inclined step L4 3 x 30 secs ea; NE stretch (relief)    Step up onto a bluefoam with (L) and (R) LE foam roll tap x 10 reps; NE tired    (L) Ankle DF mobs with wedge 2 x 10 reps; P, NW achilles tendon pull/ache      TM: Retro walk @ 10% grade x 1.3 mph x 10 mins; NE    (L) Ankle DF mobs with wedge 2 x 10 reps; P, NW anterior ankle ache/tight    Seated: (L) Ankle eccentric DF strengthening with blackTB 20 reps x 10 eccen ct ea; NE     Step standing (L) on 4 inch step (R) on 10 inch step leaning on wall 10 reps x 10 eccen ct ea; NE tired    (L) LE lateral eccentric step down from a 6 inch step 10 reps x 5 eccen ct ea; NE tired    Monster walk fwd/bkwd with redTB abduction resist  2  laps x 30 feet; NE tired    Hydrant (R/L) LE 1 kg ball 5 reps x 10 cts ea; NE     (L) Ankle DF mobs with wedge 2 x 10 reps; P, NW   Therapeutic Exercise Min 41 48 36 46   Total of Timed Procedures 41 48 36 46   Total of Service Based 0 0 0 0   Total Treatment Time 44 49 36 48         HEP  (L) Ankle DF mobs with towel roll under balls of foot 2 x 10 reps 1-2x/day    (L) Ankle DF eccentric strengthening with blackTB 20 reps x 10 eccen ct each 1-2x/day    Charges     TherEx x 3

## 2025-02-25 ENCOUNTER — PATIENT MESSAGE (OUTPATIENT)
Dept: ORTHOPEDICS CLINIC | Facility: CLINIC | Age: 50
End: 2025-02-25

## 2025-02-25 ENCOUNTER — OFFICE VISIT (OUTPATIENT)
Dept: PHYSICAL THERAPY | Age: 50
End: 2025-02-25
Attending: FAMILY MEDICINE
Payer: COMMERCIAL

## 2025-02-25 ENCOUNTER — OFFICE VISIT (OUTPATIENT)
Facility: CLINIC | Age: 50
End: 2025-02-25
Payer: COMMERCIAL

## 2025-02-25 VITALS — HEIGHT: 65 IN | WEIGHT: 184 LBS | BODY MASS INDEX: 30.66 KG/M2

## 2025-02-25 DIAGNOSIS — M65.979 PERONEAL TENOSYNOVITIS: ICD-10-CM

## 2025-02-25 DIAGNOSIS — S93.432D SPRAIN OF TIBIOFIBULAR LIGAMENT OF LEFT ANKLE, SUBSEQUENT ENCOUNTER: ICD-10-CM

## 2025-02-25 DIAGNOSIS — M67.88 ACHILLES TENDONOSIS: Primary | ICD-10-CM

## 2025-02-25 DIAGNOSIS — S93.492D SPRAIN OF ANTERIOR TALOFIBULAR LIGAMENT OF LEFT ANKLE, SUBSEQUENT ENCOUNTER: ICD-10-CM

## 2025-02-25 DIAGNOSIS — M25.522 ELBOW PAIN, LEFT: Primary | ICD-10-CM

## 2025-02-25 PROCEDURE — 99214 OFFICE O/P EST MOD 30 MIN: CPT | Performed by: FAMILY MEDICINE

## 2025-02-25 PROCEDURE — 97110 THERAPEUTIC EXERCISES: CPT | Performed by: PHYSICAL THERAPIST

## 2025-02-25 RX ORDER — NITROGLYCERIN 0.1MG/HR
1 PATCH, TRANSDERMAL 24 HOURS TRANSDERMAL DAILY
Qty: 30 EACH | Refills: 0 | Status: SHIPPED | OUTPATIENT
Start: 2025-02-25

## 2025-02-25 NOTE — TELEPHONE ENCOUNTER
LOV 01/15/2025- Seen by Gissel HURLEY notes~  49 year old female     Left elbow pain-  her pain is diffuse both lateral medial dorsal and volar.  I like to order an MRI for further evaluation.  Left wrist pain dorsally likely sprain-will give her a wrist brace for the next 4 weeks of that with some of her symptoms.  I suspect she sprained the wrist.        MRI Pended for approval~

## 2025-02-25 NOTE — PROGRESS NOTES
Sports Medicine Clinic Note    Subjective:    Chief Complaint: Left ankle pain, significantly improved, with mild Achilles discomfort.    Date of Injury: 12/27/2024    History: 49-year-old female returns for follow-up evaluation of left ankle pain. She reports significant improvement since the last visit, now ambulating without crutches and performing daily activities with minimal discomfort. However, she notes mild posterior ankle pain, particularly along the Achilles tendon, which worsens with prolonged standing or push-off motions. No acute trauma, instability, or neurological symptoms reported. She remains compliant with physical therapy and her home exercise program.    Objective:    Left Ankle Examination:    Inspection: No visible swelling or erythema. No ecchymosis or deformity noted.  Palpation: Mild residual tenderness over the peroneal tendons and focal tenderness at the midportion of the Achilles tendon. No tenderness over the anterior talofibular ligament, medial malleolus, or anterior ankle.  Range of Motion: Dorsiflexion improved to 10 degrees, plantarflexion to 30 degrees. Inversion and eversion improved with minimal discomfort.  Neurovascular: Sensation intact to light touch over superficial peroneal, deep peroneal, tibial, and sural nerve distributions. Dorsalis pedis and posterior tibial pulses palpable. Capillary refill <2 seconds.  Special Tests:  Negative anterior drawer test.  Talar tilt test improved with no significant pain or instability.  Positive pain with resisted plantarflexion.    Diagnostic Tests:    No new testing.     Previous MRI (1/14/2025) demonstrated:  Remote partial-thickness tear of the anterior talofibular ligament.  Partial-thickness tearing of the anterior and posterior tibiofibular ligaments with syndesmotic edema.  Split tear of the peroneus brevis with peroneal tenosynovitis.  Small tibiotalar and talonavicular joint effusion.    Assessment:    Improving left ankle high  ankle sprain with partial-thickness tearing of the anterior and posterior tibiofibular ligaments.  Healing split tear of the left peroneus brevis tendon with resolving peroneal tenosynovitis.  Partial-thickness tear of the anterior talofibular ligament  Mild Achilles tendinosis    Plan:    Therapy: Continue physical therapy with emphasis on Achilles tendon loading exercises, eccentric strengthening, and proprioceptive training. Encourage continued compliance with home exercises.  Medications: NSAIDs as needed for occasional discomfort.  Bracing: Discontinue brace for daily activities but may use during high-impact or high-risk activities for additional support.  Activity Recommendations: Gradual return to normal activities with continued avoidance of high-impact sports until full strength and stability are achieved. May begin light jogging in 2-3 weeks if tolerated. Avoid excessive heel raises or explosive movements until Achilles symptoms improve.  Medications: Initiate nitroglycerin patch to the Achilles tendon for tendon healing, applied once daily for up to 12 weeks as tolerated. Advise the patient on potential side effects.  Procedures: None indicated at this time.    Follow-Up: Tentatively scheduled in 4-6 weeks to assess Achilles symptom resolution and full return to activity. Return sooner if increased pain, instability, or setbacks occur.      Jonathan Birmingham DO, CAQSM   Primary Care Sports Medicine

## 2025-02-26 ENCOUNTER — TELEPHONE (OUTPATIENT)
Dept: PHYSICAL THERAPY | Facility: HOSPITAL | Age: 50
End: 2025-02-26

## 2025-03-07 ENCOUNTER — TELEPHONE (OUTPATIENT)
Dept: PHYSICAL THERAPY | Facility: HOSPITAL | Age: 50
End: 2025-03-07

## 2025-03-14 ENCOUNTER — OFFICE VISIT (OUTPATIENT)
Dept: PHYSICAL THERAPY | Age: 50
End: 2025-03-14
Attending: FAMILY MEDICINE
Payer: COMMERCIAL

## 2025-03-14 PROCEDURE — 97110 THERAPEUTIC EXERCISES: CPT | Performed by: PHYSICAL THERAPIST

## 2025-03-14 NOTE — PROGRESS NOTES
Patient: Sergei Maradiaga (49 year old, female) Referring Provider:  Insurance:   Diagnosis: Derangement of ankle, left (M24.172) Jonathan Vinnie [employer] [employer]   Date of Surgery: NA  N/A   Precautions:  None  Referral Information:    Date of Evaluation: Req: 0, Auth: 0, Exp:     02/06/25 POC Auth Visits:  12       Today's Date   3/14/2025    Subjective  Huyen report that her (L) foot/ankle is hurting/aching more today.  She was doing her HEP (L) Ankle DF mobs in standing on the floor but is not sure if she is doing it correctly.       Pain: 2/10     Objective  Test motion: (L) LE step up/over a 6 inch step: ache on step up and step down at (L) ankle.     Assessment  Huyen still respond to (L) Ankle DF mobs but requires a DF bias using a towel roll or a wedge to apply overpressure toward that direction. She was progressed with (L) Ankle DF eccentric strengthening.  All questions were answered at this time.     Plan  Re-assess next session and continue with (L) Ankle/LE strengthening, stability, stretching, patient education, and HEP progression.    Treatment Last 4 Visits       2/18/2025 2/20/2025 2/25/2025 3/14/2025   PT Treatment   Treatment Day 4 5 6 7   Therapeutic Exercise TM: Retro walk @ 10% grade x 1.0 mph x 10 mins; NE    (L) Ankle DF mobs with wedge 2 x 10 reps; P, NW achilles tendon pull/ache    - no ache (L) foot during push-off (ambulation)    Seated: (L) Ankle eccentric DF load with blackTB 20 reps x 10 eccen ct each; NE    Step standing: (L) eccen ct DF on 4 inch step 2 x 5 reps x 5 eccen ct ea; NE tired calf    (B) Calf stretch on inclined step L4 3 x 30 secs ea; NE stretch (relief)    Step up onto a bluefoam with (L) and (R) LE foam roll tap x 10 reps; NE tired    (L) Ankle DF mobs with wedge 2 x 10 reps; P, NW achilles tendon pull/ache      TM: Retro walk @ 10% grade x 1.3 mph x 10 mins; NE    (L) Ankle DF mobs with wedge 2 x 10 reps; P, NW anterior ankle ache/tight    Seated: (L) Ankle eccentric DF  strengthening with blackTB 20 reps x 10 eccen ct ea; NE     Step standing (L) on 4 inch step (R) on 10 inch step leaning on wall 10 reps x 10 eccen ct ea; NE tired    (L) LE lateral eccentric step down from a 6 inch step 10 reps x 5 eccen ct ea; NE tired    Monster walk fwd/bkwd with redTB abduction resist  2 laps x 30 feet; NE tired    Hydrant (R/L) LE 1 kg ball 5 reps x 10 cts ea; NE     (L) Ankle DF mobs with wedge 2 x 10 reps; P, NW TM: Retro walk @15% grade x 1.2 mph x 10 mins; NE    (L) Ankle DF mobs with wedge 2 x 10 reps; NE (cued to correct technique)     Seated: (L) Ankle eccentric DF strengthening with blackTB 20 reps x 10 eccen ct ea; NE     Seated: (L) Hallux PF with blackTB 2 x 20 reps; NE tired    Hydrant: (R/L) 1 kg ball 5 reps x 10 cts ea; NE weaker (L) LE (corrected technique)    Standing: (L) LE BAPS L3 DF-PF/EV-IV/CW/CCW x 20 reps ea; NE tired    (L) Ankle DF mobs with wedge x 10 reps; NE (cued to correct technique)   TM: Retro walk with 10% grade x 1.0 mph x 10 mins; NE    (L) Ankle DF mobs with towel roll under toes x 10 reps; P, NW (better)    - re-test motion: less symptom (L) Ankle    (L) Ankle DF mobs with wedge under balls of feet x 10 reps; P, NW (better)    - re-test motion: no symptom (L) Ankle    (L) Ankle DF eccentric load with blackTB 2 x 10 reps x 10 eccen ct ea; NE    (L) Hallux PF with blackTB 2 x 20 reps; NE    Shuttle: (L) leg press 6b x 20 reps; NE    Shuttle: (L) calf raises 2b x 14 reps; NE tired calf    (B) Calf stretch on inclined step L4 3 x 30 secs ea; NE    (L) Ankle DF mobs with wedge at balls of foot x 10 reps; NE   Therapeutic Exercise Min 36 46 46 47   Total of Timed Procedures 36 46 46 47   Total of Service Based 0 0 0 0   Total Treatment Time 36 48 47 48         HEP  (L) Ankle DF mobs with towel roll under balls of foot x 10-20 reps 2-3x/day  (L) Ankle DF eccentric strengthening with blackTB 10 reps x 10 eccen ct each 2-3x/day  (L) Hallux PF with blackTB x 20 reps  x 2-3x/day    Charges     TherEx x 3

## 2025-03-18 ENCOUNTER — OFFICE VISIT (OUTPATIENT)
Dept: PHYSICAL THERAPY | Age: 50
End: 2025-03-18
Attending: FAMILY MEDICINE
Payer: COMMERCIAL

## 2025-03-18 PROCEDURE — 97110 THERAPEUTIC EXERCISES: CPT | Performed by: PHYSICAL THERAPIST

## 2025-03-18 NOTE — PROGRESS NOTES
Patient: Sergei Maradiaga (49 year old, female) Referring Provider:  Insurance:   Diagnosis: Derangement of ankle, left (M24.172) Jonathan Vinnie [employer] [employer]   Date of Surgery: NA Next MD visit:  N/A   Precautions:  None NA Referral Information:    Date of Evaluation: Req: 0, Auth: 0, Exp:     02/06/25 POC Auth Visits:  12       Today's Date   3/18/2025    Subjective  Huyen report that she was sore last time but it got better after a day.  Today she feels good, there is no pain/ache and she has been doing her HEP ((R) Ankle DF mobs in standing with a towel roll once a day).       Pain: 0/10     Objective  Test motion: (L) LE step up/over a 6 inch step: ache on step up and step down at (L) ankle.       Assessment  Huyen continue to respond to her current exercises.  She does not have any pain/ache in the (R) ankle and she feels about 80% better.  She is ambulating without a limp and with an equal step length and present heel-toe pattern.  All questions were answered at this time.    Goals (to be met in 12 visits)   1. Patient to consistently perform their HEP and it's progression to maintain their improved condition.  2. Patient to have reduced and abolished symptoms to to be able to Inability to walk, climb (down>up) stairs, drive, get in/out of her car, step up/over tub, lay down her sides, and do home chores without producing or increasing symptoms in the (L) lower leg/ankle.   3. Patient to have WNL of (L) Shoulder AROM in all directions with 4/5 MMT in all motions to promote all home, work, recreational, and functional activities without discomfort or deviation.     Plan  Re-assess next session and continue with (L) Ankle/LE strengthening, stability, stretching, patient education, and HEP progression.    Treatment Last 4 Visits       2/20/2025 2/25/2025 3/14/2025 3/18/2025   PT Treatment   Treatment Day 5 6 7 8   Therapeutic Exercise TM: Retro walk @ 10% grade x 1.3 mph x 10 mins; NE    (L) Ankle DF mobs with wedge 2  x 10 reps; P, NW anterior ankle ache/tight    Seated: (L) Ankle eccentric DF strengthening with blackTB 20 reps x 10 eccen ct ea; NE     Step standing (L) on 4 inch step (R) on 10 inch step leaning on wall 10 reps x 10 eccen ct ea; NE tired    (L) LE lateral eccentric step down from a 6 inch step 10 reps x 5 eccen ct ea; NE tired    Monster walk fwd/bkwd with redTB abduction resist  2 laps x 30 feet; NE tired    Hydrant (R/L) LE 1 kg ball 5 reps x 10 cts ea; NE     (L) Ankle DF mobs with wedge 2 x 10 reps; P, NW TM: Retro walk @15% grade x 1.2 mph x 10 mins; NE    (L) Ankle DF mobs with wedge 2 x 10 reps; NE (cued to correct technique)     Seated: (L) Ankle eccentric DF strengthening with blackTB 20 reps x 10 eccen ct ea; NE     Seated: (L) Hallux PF with blackTB 2 x 20 reps; NE tired    Hydrant: (R/L) 1 kg ball 5 reps x 10 cts ea; NE weaker (L) LE (corrected technique)    Standing: (L) LE BAPS L3 DF-PF/EV-IV/CW/CCW x 20 reps ea; NE tired    (L) Ankle DF mobs with wedge x 10 reps; NE (cued to correct technique)   TM: Retro walk with 10% grade x 1.0 mph x 10 mins; NE    (L) Ankle DF mobs with towel roll under toes x 10 reps; P, NW (better)    - re-test motion: less symptom (L) Ankle    (L) Ankle DF mobs with wedge under balls of feet x 10 reps; P, NW (better)    - re-test motion: no symptom (L) Ankle    (L) Ankle DF eccentric load with blackTB 2 x 10 reps x 10 eccen ct ea; NE    (L) Hallux PF with blackTB 2 x 20 reps; NE    Shuttle: (L) leg press 6b x 20 reps; NE    Shuttle: (L) calf raises 2b x 14 reps; NE tired calf    (B) Calf stretch on inclined step L4 3 x 30 secs ea; NE    (L) Ankle DF mobs with wedge at balls of foot x 10 reps; NE    Therapeutic Exercise Min 46 46 47    Total of Timed Procedures 46 46 47    Total of Service Based 0 0 0    Total Treatment Time 48 47 48           2/20/2025 2/25/2025 3/14/2025 3/18/2025   LE Treatment   Treatment Day 5 6 7 8   Therapeutic Exercise    TM: Retro walk @ 8% grade x  1.2 mph x 10 mins; NE    (L) Ankle DF mobs with a wedge x 20 reps; NE    (L) Ankle DF eccentric strengthening with blackTB 2 x 10 reps x 10 eccen ct ea; NE    (L) Hallux PF with blackTB 2 x 20 reps; NE    Shuttle: (L) leg press 6b x 20 reps; NE    Shuttle: (L) calf raises 3b x 20 reps; NE tired    (B) Calf stretch on inclined step L4 3 x 30 secs ea; NE     Monster walk fwd/bkwd with greenTB abduction resist 2 laps x 30 feet; NE tired legs    (L) Ankle DF mobs with a wedge under balls of foot x 20 mobs; NE      Therapeutic Exercise Minutes    39   Total Time Of Timed Procedures    39   Total Time Of Service-Based Procedures    0   Total Treatment Time    40   HEP    (L) Ankle DF mobs with a wedge x 20 reps  (L) Ankle DF eccentric strengthening with blackTB 20 reps x 10 eccen ct ea x 1-2x/day        HEP  (L) Ankle DF mobs with a wedge x 20 reps  (L) Ankle DF eccentric strengthening with blackTB 20 reps x 10 eccen ct ea x 1-2x/day    Charges     TherEx x 3

## 2025-03-21 ENCOUNTER — OFFICE VISIT (OUTPATIENT)
Dept: PHYSICAL THERAPY | Age: 50
End: 2025-03-21
Attending: FAMILY MEDICINE
Payer: COMMERCIAL

## 2025-03-21 PROCEDURE — 97110 THERAPEUTIC EXERCISES: CPT | Performed by: PHYSICAL THERAPIST

## 2025-03-21 NOTE — PROGRESS NOTES
Patient: Sergei Maradiaga (50 year old, female) Referring Provider:  Insurance:   Diagnosis: Derangement of ankle, left (M24.172) Jonathan Mcclendonkristian  [employer] [employer]   Date of Surgery: NA Next MD visit:  N/A   Precautions:  None NA Referral Information:    Date of Evaluation: Req: 0, Auth: 0, Exp:     02/06/25 POC Auth Visits:  12       Today's Date   3/21/2025    Subjective  Huyen report that she was surprised that her (L) foot was not sore after her last session.  She does not have any pain/ache at this time as well. She continue to do her HEP ((L) Ankle DF mobs) regularly.       Pain: 0/10     Objective  (L) Ankle AROM: WNL in all direction.  Ambulation without a symptoms; equal step length and heel-toe gait pattern.          Assessment  Huyen remain symptomfree during and after her session.  She has WNL of (L) Ankle AROM in all directions with 4-/5 to 4/5 MMT in all motions.  She is progressing with LE strengthening/stability exercises.  Discussed with Huyen importance of prophylactically doing her DP exercise to maintain her improved condition.  She understood and agreed with the treatment plan.  All questions and concerns were answered at this time.    Goals (to be met in 12 visits)   1. Patient to consistently perform their HEP and it's progression to maintain their improved condition.  2. Patient to have reduced and abolished symptoms to to be able to Inability to walk, climb (down>up) stairs, drive, get in/out of her car, step up/over tub, lay down her sides, and do home chores without producing or increasing symptoms in the (L) lower leg/ankle.   3. Patient to have WNL of (L) Shoulder AROM in all directions with 4/5 MMT in all motions to promote all home, work, recreational, and functional activities without discomfort or deviation.         Plan  Re-assess next session and continue with (L) Ankle/LE strengthening, stability, stretching, patient education, and HEP progression.    Treatment Last 4 Visits       2/25/2025  3/14/2025 3/18/2025 3/21/2025   PT Treatment   Treatment Day 6 7 8 9   Therapeutic Exercise TM: Retro walk @15% grade x 1.2 mph x 10 mins; NE    (L) Ankle DF mobs with wedge 2 x 10 reps; NE (cued to correct technique)     Seated: (L) Ankle eccentric DF strengthening with blackTB 20 reps x 10 eccen ct ea; NE     Seated: (L) Hallux PF with blackTB 2 x 20 reps; NE tired    Hydrant: (R/L) 1 kg ball 5 reps x 10 cts ea; NE weaker (L) LE (corrected technique)    Standing: (L) LE BAPS L3 DF-PF/EV-IV/CW/CCW x 20 reps ea; NE tired    (L) Ankle DF mobs with wedge x 10 reps; NE (cued to correct technique)   TM: Retro walk with 10% grade x 1.0 mph x 10 mins; NE    (L) Ankle DF mobs with towel roll under toes x 10 reps; P, NW (better)    - re-test motion: less symptom (L) Ankle    (L) Ankle DF mobs with wedge under balls of feet x 10 reps; P, NW (better)    - re-test motion: no symptom (L) Ankle    (L) Ankle DF eccentric load with blackTB 2 x 10 reps x 10 eccen ct ea; NE    (L) Hallux PF with blackTB 2 x 20 reps; NE    Shuttle: (L) leg press 6b x 20 reps; NE    Shuttle: (L) calf raises 2b x 14 reps; NE tired calf    (B) Calf stretch on inclined step L4 3 x 30 secs ea; NE    (L) Ankle DF mobs with wedge at balls of foot x 10 reps; NE     Therapeutic Exercise Min 46 47     Total of Timed Procedures 46 47     Total of Service Based 0 0     Total Treatment Time 47 48            2/25/2025 3/14/2025 3/18/2025 3/21/2025   LE Treatment   Treatment Day 6 7 8 9   Therapeutic Exercise   TM: Retro walk @ 8% grade x 1.2 mph x 10 mins; NE    (L) Ankle DF mobs with a wedge x 20 reps; NE    (L) Ankle DF eccentric strengthening with blackTB 2 x 10 reps x 10 eccen ct ea; NE    (L) Hallux PF with blackTB 2 x 20 reps; NE    Shuttle: (L) leg press 6b x 20 reps; NE    Shuttle: (L) calf raises 3b x 20 reps; NE tired    (B) Calf stretch on inclined step L4 3 x 30 secs ea; NE     Monster walk fwd/bkwd with greenTB abduction resist 2 laps x 30 feet; NE  tired legs    (L) Ankle DF mobs with a wedge under balls of foot x 20 mobs; NE    TM: Retro walk @ 15% grade x 1.2 mph x 10 mins; NE    (L) Ankle DF mobs with wedge under balls of foot x 20 reps; NE    Seated: (L) Ankle DF eccentric strengthening with blackTB 30 reps x 10 eccen ct ea; NE    Seated: (L) Hallux PF strengthening with blackTB 2 x 20 reps; NE     Step standing lean on wall (L) LE calf eccentric DF x 6+4 reps x 10 eccen ct ea; NE muscle burn    Step up forward/straddle step onto a 6 inch step with greenTB abduction resist (R/L) LE lead x 10 reps ea; NE tired    (L) Ankle DF mobs with wedge x 10 reps; NE   Therapeutic Exercise Minutes   39 39   Total Time Of Timed Procedures   39 39   Total Time Of Service-Based Procedures   0 0   Total Treatment Time   40 40   HEP   (L) Ankle DF mobs with a wedge x 20 reps  (L) Ankle DF eccentric strengthening with blackTB 20 reps x 10 eccen ct ea x 1-2x/day (L) Ankle DF mobs with wedge under balls of foot x 20 reps x 1-2x/day    Seated: (L) Ankle DF eccentric strengthening with blackTB 30-40 reps x 10 eccen ct ea x 1-2x/day        HEP  (L) Ankle DF mobs with wedge under balls of foot x 20 reps x 1-2x/day    Seated: (L) Ankle DF eccentric strengthening with blackTB 30-40 reps x 10 eccen ct ea x 1-2x/day    Charges     TherEx x 3

## 2025-03-26 ENCOUNTER — OFFICE VISIT (OUTPATIENT)
Dept: PHYSICAL THERAPY | Age: 50
End: 2025-03-26
Attending: FAMILY MEDICINE
Payer: COMMERCIAL

## 2025-03-26 PROCEDURE — 97110 THERAPEUTIC EXERCISES: CPT | Performed by: PHYSICAL THERAPIST

## 2025-03-26 NOTE — PROGRESS NOTES
Patient: Sergei Maradiaga (50 year old, female) Referring Provider:  Insurance:   Diagnosis: Derangement of ankle, left (M24.172) Jonathan Mcclendonkristian  [facility]   Date of Surgery: NA Next MD visit:  N/A   Precautions:  None NA Referral Information:    Date of Evaluation: Req: 0, Auth: 0, Exp:     02/06/25 POC Auth Visits:  12       Today's Date   3/26/2025    Subjective  Huyen report that she is not feeling any symptom at this time.  She did feel an ache in the (L) lateral ankle two days ago but it went away.  She was doing a lot of work then and she was doing her HEP 1x/day.       Pain: 0/10     Objective  (L) Ankle/Foot test motion: Step up/over an 8 inch step: ache/weakness (L) ankle/foot on step back up.        Assessment  Huyen still responded to (L) Ankle DF mobs.  She was able to step up/over an 8 inch step without ache and weakness in the (L) Ankle after this directional preference exercise.  She was progressed with (L) LE/Ankle/Foot eccentric/concentric strengthening, stability exercises, and balance/proprioceptive exercises.  All questions and concerns were answered and addressed at this time.    Goals (to be met in 12 visits)   1. Patient to consistently perform their HEP and it's progression to maintain their improved condition.  2. Patient to have reduced and abolished symptoms to to be able to Inability to walk, climb (down>up) stairs, drive, get in/out of her car, step up/over tub, lay down her sides, and do home chores without producing or increasing symptoms in the (L) lower leg/ankle.   3. Patient to have WNL of (L) Shoulder AROM in all directions with 4/5 MMT in all motions to promote all home, work, recreational, and functional activities without discomfort or deviation.        Plan  Re-assess next session and continue with (L) Ankle/LE strengthening, stability, stretching, patient education, and HEP progression.    Treatment Last 4 Visits  Treatment Day: 10       3/18/2025 3/21/2025 3/26/2025   LE  Treatment   Therapeutic Exercise TM: Retro walk @ 8% grade x 1.2 mph x 10 mins; NE    (L) Ankle DF mobs with a wedge x 20 reps; NE    (L) Ankle DF eccentric strengthening with blackTB 2 x 10 reps x 10 eccen ct ea; NE    (L) Hallux PF with blackTB 2 x 20 reps; NE    Shuttle: (L) leg press 6b x 20 reps; NE    Shuttle: (L) calf raises 3b x 20 reps; NE tired    (B) Calf stretch on inclined step L4 3 x 30 secs ea; NE     Monster walk fwd/bkwd with greenTB abduction resist 2 laps x 30 feet; NE tired legs    (L) Ankle DF mobs with a wedge under balls of foot x 20 mobs; NE    TM: Retro walk @ 15% grade x 1.2 mph x 10 mins; NE    (L) Ankle DF mobs with wedge under balls of foot x 20 reps; NE    Seated: (L) Ankle DF eccentric strengthening with blackTB 30 reps x 10 eccen ct ea; NE    Seated: (L) Hallux PF strengthening with blackTB 2 x 20 reps; NE     Step standing lean on wall (L) LE calf eccentric DF x 6+4 reps x 10 eccen ct ea; NE muscle burn    Step up forward/straddle step onto a 6 inch step with greenTB abduction resist (R/L) LE lead x 10 reps ea; NE tired    (L) Ankle DF mobs with wedge x 10 reps; NE TM: retro walk @ 15% grade x 1.1 mph x 10 mins; NE    (L) Ankle repeated DF with wedge x 10 reps; P, NW anterior ankle    - re-test motion: (L) ankle/foot no ache/weakness on step back up    Repeat (L) Ankle DF mobs with wedge x 10 reps; P, NW    Step standing: (L) LE/Calf eccentric DF from a 4 inch step 7+3 reps x 10 eccen ct ea; NE tired    (L) LE lateral eccentric step down hang from a 6 inch step 10 reps x 5 eccen ct ea; NE tired    (L) LE SLStance with 3 lb medial reach/tap onto a foam roll x 10 rep; NE tired    Shuttle: (L) calf raises 3b x 20 reps;  NE tired    Shuttle: (L) LE plyojump straight with 3b 2 x 10 reps; NE tired    (L) Ankle DF mobs with wedge x 10 reps; P, NW anterior ankle ache/tightness   Therapeutic Exercise Minutes 39 39 46   Total Time Of Timed Procedures 39 39 46   Total Time Of Service-Based  Procedures 0 0 0   Total Treatment Time 40 40 47   HEP (L) Ankle DF mobs with a wedge x 20 reps  (L) Ankle DF eccentric strengthening with blackTB 20 reps x 10 eccen ct ea x 1-2x/day (L) Ankle DF mobs with wedge under balls of foot x 20 reps x 1-2x/day    Seated: (L) Ankle DF eccentric strengthening with blackTB 30-40 reps x 10 eccen ct ea x 1-2x/day (L) Ankle DF mobs with wedge x 10 reps x 2x/day at least        HEP  (L) Ankle DF mobs with wedge x 10 reps x 2x/day at least    Charges     TherEx x 3

## 2025-04-11 ENCOUNTER — APPOINTMENT (OUTPATIENT)
Dept: PHYSICAL THERAPY | Age: 50
End: 2025-04-11
Attending: FAMILY MEDICINE
Payer: COMMERCIAL

## 2025-04-18 ENCOUNTER — OFFICE VISIT (OUTPATIENT)
Facility: CLINIC | Age: 50
End: 2025-04-18
Payer: COMMERCIAL

## 2025-04-18 VITALS — WEIGHT: 184 LBS | HEIGHT: 65 IN | BODY MASS INDEX: 30.66 KG/M2

## 2025-04-18 DIAGNOSIS — S93.492D SPRAIN OF ANTERIOR TALOFIBULAR LIGAMENT OF LEFT ANKLE, SUBSEQUENT ENCOUNTER: Primary | ICD-10-CM

## 2025-04-18 DIAGNOSIS — M67.88 ACHILLES TENDONOSIS: ICD-10-CM

## 2025-04-18 DIAGNOSIS — S93.432D SPRAIN OF TIBIOFIBULAR LIGAMENT OF LEFT ANKLE, SUBSEQUENT ENCOUNTER: ICD-10-CM

## 2025-04-18 DIAGNOSIS — M65.979 PERONEAL TENOSYNOVITIS: ICD-10-CM

## 2025-04-18 PROCEDURE — 99214 OFFICE O/P EST MOD 30 MIN: CPT | Performed by: FAMILY MEDICINE

## 2025-04-18 NOTE — PROGRESS NOTES
Sports Medicine Clinic Note    Subjective:    Chief Complaint: Persistent left ankle and knee pain    Date of Injury: 12/27/2024    History: 50-year-old female presents for follow-up regarding persistent pain in the left ankle and knee following [place] the mall in late [place]. She has completed a full course of physical therapy but continues to experience pain, particularly in the ankle, with prolonged walking. The discomfort limits her ability to perform job-related tasks and has prompted consideration of further work accommodations. She reports increased symptoms since returning to on-site work. She is not taking pain medications but adheres to a home exercise regimen. She expresses concern about long-term outcomes and the potential need for surgical intervention if conservative management fails.    Objective:    Left Ankle Examination:    Inspection: No gross deformity or erythema. Mild residual swelling over the lateral ankle noted.    Palpation: Tenderness over the anterior talofibular ligament and peroneal tendons. No tenderness over medial malleolus or Achilles.    Range of Motion: Dorsiflexion 5 degrees, plantarflexion 20 degrees. Inversion and eversion limited due to pain.    Neurovascular: Intact sensation in superficial peroneal, deep peroneal, tibial, and sural distributions. Pulses palpable.    Special Tests: No laxity with anterior drawer. Talar tilt limited due to pain, no gross instability noted.    Left Knee Examination:    Inspection: No swelling, ecchymosis, or deformity.    Palpation: Mild generalized tenderness over lateral joint line.    Range of Motion: Full passive extension and flexion with minimal discomfort.    Neurovascular: Intact sensation and pulses.    Special Tests: Negative Lachman, Melanie, and varus/valgus stress tests.    Diagnostic Tests:    No new testing.     Previous MRI (1/14/2025) demonstrated:    Remote partial-thickness tear of the anterior talofibular ligament.     Partial-thickness tearing of the anterior and posterior tibiofibular ligaments with syndesmotic edema.    Split tear of the peroneus brevis with peroneal tenosynovitis.    Small tibiotalar and talonavicular joint effusion.    Soft tissue edema throughout the ankle without mass or loculated collection.    Assessment:    Persistent left ankle high ankle sprain with partial-thickness tearing of the anterior and posterior tibiofibular ligaments and associated syndesmotic edema    Healing split tear of the peroneus brevis tendon with resolving peroneal tenosynovitis    Remote partial-thickness tear of the anterior talofibular ligament    Left knee pain, likely secondary to compensatory biomechanics and overuse    Plan:    Additional Workup: None indicated at this time    Therapy: Continue physical therapy with targeted focus on syndesmotic stabilization, peroneal tendon mobility, and knee strengthening. Emphasize proprioceptive training and gait mechanics    Medications: NSAIDs as needed for pain    Bracing/Casting: Continue use of ankle brace during ambulation. Consider walking boot if symptoms do not improve    Procedures: Consider ultrasound-guided corticosteroid injection into the peroneal tendon sheath if symptoms persist after 4 more weeks of therapy    Activity Recommendations: Recommend work accommodations for remote work for an additional 6-8 weeks. Avoid prolonged walking or uneven surfaces. Gradual reintroduction to full weight-bearing as tolerated    Follow-Up: Tentatively scheduled once additional physical therapy is completed or sooner if symptoms worsen or surgical intervention is being considered      Jonathan Birmingham DO, CAQSM   Primary Care Sports Medicine

## 2025-04-29 ENCOUNTER — OFFICE VISIT (OUTPATIENT)
Dept: PHYSICAL THERAPY | Age: 50
End: 2025-04-29
Attending: FAMILY MEDICINE
Payer: COMMERCIAL

## 2025-04-29 PROCEDURE — 97110 THERAPEUTIC EXERCISES: CPT | Performed by: PHYSICAL THERAPIST

## 2025-04-30 NOTE — PROGRESS NOTES
Patient: Sergei Maradiaga (50 year old, female) Referring Provider:  Insurance:   Diagnosis: Derangement of ankle, left (M24.172) [facility]   Date of Surgery: NA Next MD visit:  N/A   Precautions:  None NA Referral Information:    Date of Evaluation: Req: 0, Auth: 0, Exp:     02/06/25 POC Auth Visits:  12       Today's Date   4/29/2025    Subjective  Huyen state that her (L) ankle feels achy/tight walking and doing certain movements.  At this time it feels fine but she gets the thightness on some movements but not all the time.  She has been doing her HEP ((L) Ankle DF mobs with a towel roll) but is not sure if she is doing it correctly.       Pain: 0/10     Objective  (L) Ankle/Foot test motion: Step up/over an 8 inch step: ache/weakness (L) ankle/foot on step back up.        Assessment  Huyen continue to respond to (L) Ankle DF mobs but was cued to correct her techqiue and apply more force to the ankle.  She felt pain/ache as she was doing her exercise but felt no pain/ache/tightness afterward.  She understood the correction and will be doing the corrections at home.  All questions were answered at this time.    Goals (to be met in 12 visits)   1. Patient to consistently perform their HEP and it's progression to maintain their improved condition.  2. Patient to have reduced and abolished symptoms to to be able to Inability to walk, climb (down>up) stairs, drive, get in/out of her car, step up/over tub, lay down her sides, and do home chores without producing or increasing symptoms in the (L) lower leg/ankle.   3. Patient to have WNL of (L) Shoulder AROM in all directions with 4/5 MMT in all motions to promote all home, work, recreational, and functional activities without discomfort or deviation.        Plan  Re-assess next session and continue with (L) Ankle/LE strengthening, stability, stretching, patient education, and HEP progression.    Treatment Last 4 Visits  Treatment Day: 10       3/18/2025  3/21/2025 3/26/2025   LE Treatment   Therapeutic Exercise TM: Retro walk @ 8% grade x 1.2 mph x 10 mins; NE    (L) Ankle DF mobs with a wedge x 20 reps; NE    (L) Ankle DF eccentric strengthening with blackTB 2 x 10 reps x 10 eccen ct ea; NE    (L) Hallux PF with blackTB 2 x 20 reps; NE    Shuttle: (L) leg press 6b x 20 reps; NE    Shuttle: (L) calf raises 3b x 20 reps; NE tired    (B) Calf stretch on inclined step L4 3 x 30 secs ea; NE     Monster walk fwd/bkwd with greenTB abduction resist 2 laps x 30 feet; NE tired legs    (L) Ankle DF mobs with a wedge under balls of foot x 20 mobs; NE    TM: Retro walk @ 15% grade x 1.2 mph x 10 mins; NE    (L) Ankle DF mobs with wedge under balls of foot x 20 reps; NE    Seated: (L) Ankle DF eccentric strengthening with blackTB 30 reps x 10 eccen ct ea; NE    Seated: (L) Hallux PF strengthening with blackTB 2 x 20 reps; NE     Step standing lean on wall (L) LE calf eccentric DF x 6+4 reps x 10 eccen ct ea; NE muscle burn    Step up forward/straddle step onto a 6 inch step with greenTB abduction resist (R/L) LE lead x 10 reps ea; NE tired    (L) Ankle DF mobs with wedge x 10 reps; NE TM: retro walk @ 15% grade x 1.1 mph x 10 mins; NE    (L) Ankle repeated DF with wedge x 10 reps; P, NW anterior ankle    - re-test motion: (L) ankle/foot no ache/weakness on step back up    Repeat (L) Ankle DF mobs with wedge x 10 reps; P, NW    Step standing: (L) LE/Calf eccentric DF from a 4 inch step 7+3 reps x 10 eccen ct ea; NE tired    (L) LE lateral eccentric step down hang from a 6 inch step 10 reps x 5 eccen ct ea; NE tired    (L) LE SLStance with 3 lb medial reach/tap onto a foam roll x 10 rep; NE tired    Shuttle: (L) calf raises 3b x 20 reps;  NE tired    Shuttle: (L) LE plyojump straight with 3b 2 x 10 reps; NE tired    (L) Ankle DF mobs with wedge x 10 reps; P, NW anterior ankle ache/tightness   Therapeutic Exercise Minutes 39 39 46   Total Time Of Timed Procedures 39 39 46   Total  Time Of Service-Based Procedures 0 0 0   Total Treatment Time 40 40 47   HEP (L) Ankle DF mobs with a wedge x 20 reps  (L) Ankle DF eccentric strengthening with blackTB 20 reps x 10 eccen ct ea x 1-2x/day (L) Ankle DF mobs with wedge under balls of foot x 20 reps x 1-2x/day    Seated: (L) Ankle DF eccentric strengthening with blackTB 30-40 reps x 10 eccen ct ea x 1-2x/day (L) Ankle DF mobs with wedge x 10 reps x 2x/day at least        HEP  (L) Ankle DF mobs with wedge x 10 reps x 2x/day at least    Charges     TherEx x 3                           Plan  Re-assess next session and continue with (L) Ankle/LE strengthening, stability, stretching, patient education, and HEP progression.    Treatment Last 4 Visits  Treatment Day: 11       3/18/2025 3/21/2025 3/26/2025 4/29/2025   LE Treatment   Therapeutic Exercise TM: Retro walk @ 8% grade x 1.2 mph x 10 mins; NE    (L) Ankle DF mobs with a wedge x 20 reps; NE    (L) Ankle DF eccentric strengthening with blackTB 2 x 10 reps x 10 eccen ct ea; NE    (L) Hallux PF with blackTB 2 x 20 reps; NE    Shuttle: (L) leg press 6b x 20 reps; NE    Shuttle: (L) calf raises 3b x 20 reps; NE tired    (B) Calf stretch on inclined step L4 3 x 30 secs ea; NE     Monster walk fwd/bkwd with greenTB abduction resist 2 laps x 30 feet; NE tired legs    (L) Ankle DF mobs with a wedge under balls of foot x 20 mobs; NE    TM: Retro walk @ 15% grade x 1.2 mph x 10 mins; NE    (L) Ankle DF mobs with wedge under balls of foot x 20 reps; NE    Seated: (L) Ankle DF eccentric strengthening with blackTB 30 reps x 10 eccen ct ea; NE    Seated: (L) Hallux PF strengthening with blackTB 2 x 20 reps; NE     Step standing lean on wall (L) LE calf eccentric DF x 6+4 reps x 10 eccen ct ea; NE muscle burn    Step up forward/straddle step onto a 6 inch step with greenTB abduction resist (R/L) LE lead x 10 reps ea; NE tired    (L) Ankle DF mobs with wedge x 10 reps; NE TM: retro walk @ 15% grade x 1.1 mph x 10  mins; NE    (L) Ankle repeated DF with wedge x 10 reps; P, NW anterior ankle    - re-test motion: (L) ankle/foot no ache/weakness on step back up    Repeat (L) Ankle DF mobs with wedge x 10 reps; P, NW    Step standing: (L) LE/Calf eccentric DF from a 4 inch step 7+3 reps x 10 eccen ct ea; NE tired    (L) LE lateral eccentric step down hang from a 6 inch step 10 reps x 5 eccen ct ea; NE tired    (L) LE SLStance with 3 lb medial reach/tap onto a foam roll x 10 rep; NE tired    Shuttle: (L) calf raises 3b x 20 reps;  NE tired    Shuttle: (L) LE plyojump straight with 3b 2 x 10 reps; NE tired    (L) Ankle DF mobs with wedge x 10 reps; P, NW anterior ankle ache/tightness TM: Retro walk @ 8% grade x 1.0 mph x 10 mins; NE    Standing: (L) Ankle DF mobs with towel roll x 10 reps; P, NW (better)    - no ache/tightness step up/over an 8 inch step    Step standing: (L) Ankle DF eccentric loading on a 4 inch step x 10 reps x 10 eccen ct ea; NE     (L) Ankle DF mobs with towel roll x 10 reps; NE      Therapeutic Exercise Minutes 39 39 46 30   Total Time Of Timed Procedures 39 39 46 30   Total Time Of Service-Based Procedures 0 0 0 0   Total Treatment Time 40 40 47 31   HEP (L) Ankle DF mobs with a wedge x 20 reps  (L) Ankle DF eccentric strengthening with blackTB 20 reps x 10 eccen ct ea x 1-2x/day (L) Ankle DF mobs with wedge under balls of foot x 20 reps x 1-2x/day    Seated: (L) Ankle DF eccentric strengthening with blackTB 30-40 reps x 10 eccen ct ea x 1-2x/day (L) Ankle DF mobs with wedge x 10 reps x 2x/day at least (L) Ankle DF mobs with towel roll x 10 reps x 2-3x/day        HEP  (L) Ankle DF mobs with towel roll x 10 reps x 2-3x/day    Charges     TherEx x 2

## 2025-05-06 ENCOUNTER — APPOINTMENT (OUTPATIENT)
Dept: PHYSICAL THERAPY | Age: 50
End: 2025-05-06
Attending: FAMILY MEDICINE
Payer: COMMERCIAL

## 2025-05-07 ENCOUNTER — APPOINTMENT (OUTPATIENT)
Dept: PHYSICAL THERAPY | Age: 50
End: 2025-05-07
Attending: FAMILY MEDICINE
Payer: COMMERCIAL

## 2025-05-13 ENCOUNTER — OFFICE VISIT (OUTPATIENT)
Dept: PHYSICAL THERAPY | Age: 50
End: 2025-05-13
Attending: FAMILY MEDICINE
Payer: COMMERCIAL

## 2025-05-13 PROCEDURE — 97110 THERAPEUTIC EXERCISES: CPT | Performed by: PHYSICAL THERAPIST

## 2025-05-14 NOTE — PROGRESS NOTES
Patient: Sergei Maradiaga (50 year old, female) Referring Provider:  Insurance:   Diagnosis: Derangement of ankle, left (M24.172) Jonathan Vinnie [facility] [employer]   Date of Surgery: NA Next MD visit:  N/A   Precautions:  None NA Referral Information:    Date of Evaluation: Req: 0, Auth: 0, Exp:     02/06/25 POC Auth Visits:  12       Today's Date   5/13/2025    Subjective  Huyen state that her (L) ankle does not hurt.  She has been doing her HEP ((L) Ankle DF mobs and blackTB eccentric strengthening) regularly about 2-3x/week.  She was reminded to do her exercises more often.  She mainly feel good in the (L) ankle but feels weak and easily gets tired.  She was wondering if she can start walking on a trail already.       Pain: 0/10     Objective  No pain/ache walking; WFL of (L) Ankle AROM in all directions with 3+ to 4-/5 MMT in all motions.       Assessment  Huyen was reviewed her primary HEP and discussed the correct technique with (L) Ankle DF mobs in standing using a towel roll for DF bias. She was progressed with (L) ankle/foot/calf strengthening/stabilty exercises.  She was tired after her session in the (L) ankle/foot but no report of pain.  All questions were answered at this time.    Goals (to be met in 12 visits)   1. Patient to consistently perform their HEP and it's progression to maintain their improved condition.  2. Patient to have reduced and abolished symptoms to to be able to Inability to walk, climb (down>up) stairs, drive, get in/out of her car, step up/over tub, lay down her sides, and do home chores without producing or increasing symptoms in the (L) lower leg/ankle.   3. Patient to have WNL of (L) Shoulder AROM in all directions with 4/5 MMT in all motions to promote all home, work, recreational, and functional activities without discomfort or deviation.        Plan  Re-assess next session and continue with (L) Ankle/LE strengthening, stability, stretching, patient education, and HEP  progression.    Treatment Last 4 Visits  Treatment Day: 10       3/18/2025 3/21/2025 3/26/2025   LE Treatment   Therapeutic Exercise TM: Retro walk @ 8% grade x 1.2 mph x 10 mins; NE    (L) Ankle DF mobs with a wedge x 20 reps; NE    (L) Ankle DF eccentric strengthening with blackTB 2 x 10 reps x 10 eccen ct ea; NE    (L) Hallux PF with blackTB 2 x 20 reps; NE    Shuttle: (L) leg press 6b x 20 reps; NE    Shuttle: (L) calf raises 3b x 20 reps; NE tired    (B) Calf stretch on inclined step L4 3 x 30 secs ea; NE     Monster walk fwd/bkwd with greenTB abduction resist 2 laps x 30 feet; NE tired legs    (L) Ankle DF mobs with a wedge under balls of foot x 20 mobs; NE    TM: Retro walk @ 15% grade x 1.2 mph x 10 mins; NE    (L) Ankle DF mobs with wedge under balls of foot x 20 reps; NE    Seated: (L) Ankle DF eccentric strengthening with blackTB 30 reps x 10 eccen ct ea; NE    Seated: (L) Hallux PF strengthening with blackTB 2 x 20 reps; NE     Step standing lean on wall (L) LE calf eccentric DF x 6+4 reps x 10 eccen ct ea; NE muscle burn    Step up forward/straddle step onto a 6 inch step with greenTB abduction resist (R/L) LE lead x 10 reps ea; NE tired    (L) Ankle DF mobs with wedge x 10 reps; NE TM: retro walk @ 15% grade x 1.1 mph x 10 mins; NE    (L) Ankle repeated DF with wedge x 10 reps; P, NW anterior ankle    - re-test motion: (L) ankle/foot no ache/weakness on step back up    Repeat (L) Ankle DF mobs with wedge x 10 reps; P, NW    Step standing: (L) LE/Calf eccentric DF from a 4 inch step 7+3 reps x 10 eccen ct ea; NE tired    (L) LE lateral eccentric step down hang from a 6 inch step 10 reps x 5 eccen ct ea; NE tired    (L) LE SLStance with 3 lb medial reach/tap onto a foam roll x 10 rep; NE tired    Shuttle: (L) calf raises 3b x 20 reps;  NE tired    Shuttle: (L) LE plyojump straight with 3b 2 x 10 reps; NE tired    (L) Ankle DF mobs with wedge x 10 reps; P, NW anterior ankle ache/tightness   Therapeutic  Exercise Minutes 39 39 46   Total Time Of Timed Procedures 39 39 46   Total Time Of Service-Based Procedures 0 0 0   Total Treatment Time 40 40 47   HEP (L) Ankle DF mobs with a wedge x 20 reps  (L) Ankle DF eccentric strengthening with blackTB 20 reps x 10 eccen ct ea x 1-2x/day (L) Ankle DF mobs with wedge under balls of foot x 20 reps x 1-2x/day    Seated: (L) Ankle DF eccentric strengthening with blackTB 30-40 reps x 10 eccen ct ea x 1-2x/day (L) Ankle DF mobs with wedge x 10 reps x 2x/day at least        HEP  (L) Ankle DF mobs with wedge x 10 reps x 2x/day at least    Charges     TherEx x 3                               Plan  Re-assess next session and continue with (L) Ankle/LE strengthening, stability, stretching, patient education, and HEP progression.    Treatment Last 4 Visits  Treatment Day: 12       3/21/2025 3/26/2025 4/29/2025 5/13/2025   LE Treatment   Therapeutic Exercise TM: Retro walk @ 15% grade x 1.2 mph x 10 mins; NE    (L) Ankle DF mobs with wedge under balls of foot x 20 reps; NE    Seated: (L) Ankle DF eccentric strengthening with blackTB 30 reps x 10 eccen ct ea; NE    Seated: (L) Hallux PF strengthening with blackTB 2 x 20 reps; NE     Step standing lean on wall (L) LE calf eccentric DF x 6+4 reps x 10 eccen ct ea; NE muscle burn    Step up forward/straddle step onto a 6 inch step with greenTB abduction resist (R/L) LE lead x 10 reps ea; NE tired    (L) Ankle DF mobs with wedge x 10 reps; NE TM: retro walk @ 15% grade x 1.1 mph x 10 mins; NE    (L) Ankle repeated DF with wedge x 10 reps; P, NW anterior ankle    - re-test motion: (L) ankle/foot no ache/weakness on step back up    Repeat (L) Ankle DF mobs with wedge x 10 reps; P, NW    Step standing: (L) LE/Calf eccentric DF from a 4 inch step 7+3 reps x 10 eccen ct ea; NE tired    (L) LE lateral eccentric step down hang from a 6 inch step 10 reps x 5 eccen ct ea; NE tired    (L) LE SLStance with 3 lb medial reach/tap onto a foam roll x 10  rep; NE tired    Shuttle: (L) calf raises 3b x 20 reps;  NE tired    Shuttle: (L) LE plyojump straight with 3b 2 x 10 reps; NE tired    (L) Ankle DF mobs with wedge x 10 reps; P, NW anterior ankle ache/tightness TM: Retro walk @ 8% grade x 1.0 mph x 10 mins; NE    Standing: (L) Ankle DF mobs with towel roll x 10 reps; P, NW (better)    - no ache/tightness step up/over an 8 inch step    Step standing: (L) Ankle DF eccentric loading on a 4 inch step x 10 reps x 10 eccen ct ea; NE     (L) Ankle DF mobs with towel roll x 10 reps; NE    TM: Retro walk @ 10% grade x 1.2 mph x 10 mins; NE tired    Seated: (L) Ankle DF with towel OP x 10 reps; NE    + eccentric DF with blackTB 10 reps x 10 eccen ct ea; NE    Standing: (L) Ankle DF mobs with wedge x 10 reps; NE    Step standing on 4 inch step (L) Ankle calf raises concentric/eccentric load 5 sets x 5 reps x 5 eccen ct on the last rep; NE tired    (L) LE lateral eccentric step down from a 6 inch step x 10 reps x 5 eccen ct ea; NE tired    (L) LE forward eccentric step down from a 6 inch step x 10 reps x 5 eccen ct ea; NE tired    Monster walk with greenTB abduction resist 2 laps x 30 feet; NE tired    Standing: (L) Ankle DF mobs with wedge x 10 reps; NE   Therapeutic Exercise Minutes 39 46 30 44   Total Time Of Timed Procedures 39 46 30 44   Total Time Of Service-Based Procedures 0 0 0 0   Total Treatment Time 40 47 31 46   HEP (L) Ankle DF mobs with wedge under balls of foot x 20 reps x 1-2x/day    Seated: (L) Ankle DF eccentric strengthening with blackTB 30-40 reps x 10 eccen ct ea x 1-2x/day (L) Ankle DF mobs with wedge x 10 reps x 2x/day at least (L) Ankle DF mobs with towel roll x 10 reps x 2-3x/day Standing: (L) Ankle DF mobs with wedge x 10 reps x 1-2x/day    (L) Ankle DF eccentric strengthening with blackTB x 10-20 reps x 10 eccen ct ea x 1-2x/day            HEP  Standing: (L) Ankle DF mobs with wedge x 10 reps x 1-2x/day    (L) Ankle DF eccentric strengthening with  blackTB x 10-20 reps x 10 eccen ct ea x 1-2x/day        Charges     TherEx x 3

## 2025-05-16 ENCOUNTER — OFFICE VISIT (OUTPATIENT)
Dept: PHYSICAL THERAPY | Age: 50
End: 2025-05-16
Attending: FAMILY MEDICINE
Payer: COMMERCIAL

## 2025-05-16 DIAGNOSIS — M79.662 PAIN IN LEFT LOWER LEG: ICD-10-CM

## 2025-05-16 DIAGNOSIS — S93.492D SPRAIN OF OTHER LIGAMENT OF LEFT ANKLE, SUBSEQUENT ENCOUNTER: ICD-10-CM

## 2025-05-16 DIAGNOSIS — M25.552 PAIN OF LEFT HIP: ICD-10-CM

## 2025-05-16 PROCEDURE — 97110 THERAPEUTIC EXERCISES: CPT | Performed by: PHYSICAL THERAPIST

## 2025-05-16 NOTE — PROGRESS NOTES
Patient: Sergei Maradiaga (50 year old, female) Referring Provider:  Insurance:   Diagnosis: Derangement of ankle, left (M24.172) [facility]   Date of Surgery: NA Next MD visit:  N/A   Precautions:  None NA Referral Information:    Date of Evaluation: Req: 0, Auth: 0, Exp:     02/06/25 POC Auth Visits:  24 (New PT Rx for 12 more visits 4/18/2025)       Today's Date   5/16/2025    Subjective  Huyen report that she is feeling no pain/ache in the (L) ankle/foot but she is feeling cramping and tightness in the (L) calf.  She has been active doing more walking and she continue to do her (L) ankle/foot DF mobs in standing using a towel roll under the toes for DF bias.       Pain: 0/10     Objective  No pain/ache walking; WFL of (L) Ankle AROM in all directions with 4-/5 MMT in all motions.        Assessment  Huyen remained symptomfree in the (L) ankle/foot during and after her session.  She was able to perform increased resistance and repetitions and light plyometric exercises for the (L) ankle/foot/leg with minimal cueing to correct alignment and technique.  She was encouraged to walk outside but to always start and end with the (L) Ankle DF mobs.  She understood and agreed with the treatment plan.  All questions were aswered at this time.    Goals (to be met in 24 visits)   1. Patient to consistently perform their HEP and it's progression to maintain their improved condition.  2. Patient to have reduced and abolished symptoms to to be able to Inability to walk, climb (down>up) stairs, drive, get in/out of her car, step up/over tub, lay down her sides, and do home chores without producing or increasing symptoms in the (L) lower leg/ankle.   3. Patient to have WNL of (L) Shoulder AROM in all directions with 4/5 MMT in all motions to promote all home, work, recreational, and functional activities without discomfort or deviation.        Plan  Re-assess next session and continue with (L) Ankle/LE strengthening,  stability, stretching, patient education, and HEP progression.    Treatment Last 4 Visits  Treatment Day: 10       3/18/2025 3/21/2025 3/26/2025   LE Treatment   Therapeutic Exercise TM: Retro walk @ 8% grade x 1.2 mph x 10 mins; NE    (L) Ankle DF mobs with a wedge x 20 reps; NE    (L) Ankle DF eccentric strengthening with blackTB 2 x 10 reps x 10 eccen ct ea; NE    (L) Hallux PF with blackTB 2 x 20 reps; NE    Shuttle: (L) leg press 6b x 20 reps; NE    Shuttle: (L) calf raises 3b x 20 reps; NE tired    (B) Calf stretch on inclined step L4 3 x 30 secs ea; NE     Monster walk fwd/bkwd with greenTB abduction resist 2 laps x 30 feet; NE tired legs    (L) Ankle DF mobs with a wedge under balls of foot x 20 mobs; NE    TM: Retro walk @ 15% grade x 1.2 mph x 10 mins; NE    (L) Ankle DF mobs with wedge under balls of foot x 20 reps; NE    Seated: (L) Ankle DF eccentric strengthening with blackTB 30 reps x 10 eccen ct ea; NE    Seated: (L) Hallux PF strengthening with blackTB 2 x 20 reps; NE     Step standing lean on wall (L) LE calf eccentric DF x 6+4 reps x 10 eccen ct ea; NE muscle burn    Step up forward/straddle step onto a 6 inch step with greenTB abduction resist (R/L) LE lead x 10 reps ea; NE tired    (L) Ankle DF mobs with wedge x 10 reps; NE TM: retro walk @ 15% grade x 1.1 mph x 10 mins; NE    (L) Ankle repeated DF with wedge x 10 reps; P, NW anterior ankle    - re-test motion: (L) ankle/foot no ache/weakness on step back up    Repeat (L) Ankle DF mobs with wedge x 10 reps; P, NW    Step standing: (L) LE/Calf eccentric DF from a 4 inch step 7+3 reps x 10 eccen ct ea; NE tired    (L) LE lateral eccentric step down hang from a 6 inch step 10 reps x 5 eccen ct ea; NE tired    (L) LE SLStance with 3 lb medial reach/tap onto a foam roll x 10 rep; NE tired    Shuttle: (L) calf raises 3b x 20 reps;  NE tired    Shuttle: (L) LE plyojump straight with 3b 2 x 10 reps; NE tired    (L) Ankle DF mobs with wedge x 10 reps; P,  NW anterior ankle ache/tightness   Therapeutic Exercise Minutes 39 39 46   Total Time Of Timed Procedures 39 39 46   Total Time Of Service-Based Procedures 0 0 0   Total Treatment Time 40 40 47   HEP (L) Ankle DF mobs with a wedge x 20 reps  (L) Ankle DF eccentric strengthening with blackTB 20 reps x 10 eccen ct ea x 1-2x/day (L) Ankle DF mobs with wedge under balls of foot x 20 reps x 1-2x/day    Seated: (L) Ankle DF eccentric strengthening with blackTB 30-40 reps x 10 eccen ct ea x 1-2x/day (L) Ankle DF mobs with wedge x 10 reps x 2x/day at least        HEP  (L) Ankle DF mobs with wedge x 10 reps x 2x/day at least    Charges     TherEx x 3                                   Plan  Re-assess next session and continue with (L) Ankle/LE strengthening, stability, stretching, patient education, and HEP progression.    Treatment Last 4 Visits  Treatment Day: 13       3/26/2025 4/29/2025 5/13/2025 5/16/2025   LE Treatment   Therapeutic Exercise TM: retro walk @ 15% grade x 1.1 mph x 10 mins; NE    (L) Ankle repeated DF with wedge x 10 reps; P, NW anterior ankle    - re-test motion: (L) ankle/foot no ache/weakness on step back up    Repeat (L) Ankle DF mobs with wedge x 10 reps; P, NW    Step standing: (L) LE/Calf eccentric DF from a 4 inch step 7+3 reps x 10 eccen ct ea; NE tired    (L) LE lateral eccentric step down hang from a 6 inch step 10 reps x 5 eccen ct ea; NE tired    (L) LE SLStance with 3 lb medial reach/tap onto a foam roll x 10 rep; NE tired    Shuttle: (L) calf raises 3b x 20 reps;  NE tired    Shuttle: (L) LE plyojump straight with 3b 2 x 10 reps; NE tired    (L) Ankle DF mobs with wedge x 10 reps; P, NW anterior ankle ache/tightness TM: Retro walk @ 8% grade x 1.0 mph x 10 mins; NE    Standing: (L) Ankle DF mobs with towel roll x 10 reps; P, NW (better)    - no ache/tightness step up/over an 8 inch step    Step standing: (L) Ankle DF eccentric loading on a 4 inch step x 10 reps x 10 eccen ct ea; NE     (L)  Ankle DF mobs with towel roll x 10 reps; NE    TM: Retro walk @ 10% grade x 1.2 mph x 10 mins; NE tired    Seated: (L) Ankle DF with towel OP x 10 reps; NE    + eccentric DF with blackTB 10 reps x 10 eccen ct ea; NE    Standing: (L) Ankle DF mobs with wedge x 10 reps; NE    Step standing on 4 inch step (L) Ankle calf raises concentric/eccentric load 5 sets x 5 reps x 5 eccen ct on the last rep; NE tired    (L) LE lateral eccentric step down from a 6 inch step x 10 reps x 5 eccen ct ea; NE tired    (L) LE forward eccentric step down from a 6 inch step x 10 reps x 5 eccen ct ea; NE tired    Monster walk with greenTB abduction resist 2 laps x 30 feet; NE tired    Standing: (L) Ankle DF mobs with wedge x 10 reps; NE TM: Retro walk @ 10% grade x 1.0 mph x 10 mins; NE     Standing: (L) Ankle DF mobs with wedge x 10 reps; NE    (L) Calf raises from a 4 inch step concentric/eccentric load x 2 x 5 sets x 5 reps ea x 5 eccen ct on last rep; NE tired    Shuttle: (L) LE plyojump straight/lateral 3b 2 x 20 reps ea; NE tired    (L) LE lateral eccen step down hang from an 8 inch step x 10 reps x 5 eccen ct ea; NE tired    Standing: (L) Ankle DF mobs with a wedge x 10 reps; NE   Therapeutic Exercise Minutes 46 30 44 39   Total Time Of Timed Procedures 46 30 44 39   Total Time Of Service-Based Procedures 0 0 0 0   Total Treatment Time 47 31 46 40   HEP (L) Ankle DF mobs with wedge x 10 reps x 2x/day at least (L) Ankle DF mobs with towel roll x 10 reps x 2-3x/day Standing: (L) Ankle DF mobs with wedge x 10 reps x 1-2x/day    (L) Ankle DF eccentric strengthening with blackTB x 10-20 reps x 10 eccen ct ea x 1-2x/day     Standing: (L) Ankle DF mobs with wedge x 10 reps x 1-2x/day    (L) Ankle DF eccentric strengthening with blackTB 20 reps x 10 eccen ct ea x 1-2x/day        HEP  Standing: (L) Ankle DF mobs with wedge x 10 reps x 1-2x/day    (L) Ankle DF eccentric strengthening with blackTB 20 reps x 10 eccen ct ea x  1-2x/day    Charges     TherEx x 3

## 2025-05-18 RX ORDER — DICLOFENAC SODIUM 75 MG/1
TABLET, DELAYED RELEASE ORAL
Qty: 60 TABLET | Refills: 0 | Status: SHIPPED | OUTPATIENT
Start: 2025-05-18

## 2025-05-21 ENCOUNTER — OFFICE VISIT (OUTPATIENT)
Dept: PHYSICAL THERAPY | Age: 50
End: 2025-05-21
Attending: FAMILY MEDICINE
Payer: COMMERCIAL

## 2025-05-21 PROCEDURE — 97110 THERAPEUTIC EXERCISES: CPT | Performed by: PHYSICAL THERAPIST

## 2025-05-21 NOTE — PROGRESS NOTES
Patient: Sergei Maradiaga (50 year old, female) Referring Provider:  Insurance:   Diagnosis: Derangement of ankle, left (M24.172) [facility]   Date of Surgery: NA Next MD visit:  N/A   Precautions:  None NA Referral Information:    Date of Evaluation: Req: 0, Auth: 0, Exp:     02/06/25 POC Auth Visits:  24       Today's Date   5/21/2025    Subjective  Huyen state that she is feeling good in the (L) ankle.  She realized yesterday that she is much better because she walked a long distance with gym shoes on and her (L) ankle/foot did not bother her.  She also understand the importance of wearing more supportive shoes other than wearing crocs.  She has been doing her HEP ((L) Ankle DF mobs and eccentric strengthening) at least 1x/day.       Pain: 0/10     Objective  No pain/ache walking; WFL of (L) Ankle AROM in all directions with 4/5 MMT in all motions.            Assessment  Huyen continue to feel better in the (L) ankle/foot and is maintaining with a (L) ankle DF directional preference exercise.  Focused today on progressing (L) ankle/foot strenghthening/stability exercises.  She was tired during and after her session without producing symptoms in the (L) ankle/foot.  Minimal cueing needed to promote good knee-toe alignment during exercises.  All questions were answered at this time.    Goals (to be met in 24 visits)   1. Patient to consistently perform their HEP and it's progression to maintain their improved condition.  2. Patient to have reduced and abolished symptoms to to be able to Inability to walk, climb (down>up) stairs, drive, get in/out of her car, step up/over tub, lay down her sides, and do home chores without producing or increasing symptoms in the (L) lower leg/ankle.   3. Patient to have WNL of (L) Shoulder AROM in all directions with 4/5 MMT in all motions to promote all home, work, recreational, and functional activities without discomfort or deviation.        Plan  Re-assess next  session and continue with (L) Ankle/LE strengthening, stability, stretching, patient education, and HEP progression.    Treatment Last 4 Visits  Treatment Day: 10       3/18/2025 3/21/2025 3/26/2025   LE Treatment   Therapeutic Exercise TM: Retro walk @ 8% grade x 1.2 mph x 10 mins; NE    (L) Ankle DF mobs with a wedge x 20 reps; NE    (L) Ankle DF eccentric strengthening with blackTB 2 x 10 reps x 10 eccen ct ea; NE    (L) Hallux PF with blackTB 2 x 20 reps; NE    Shuttle: (L) leg press 6b x 20 reps; NE    Shuttle: (L) calf raises 3b x 20 reps; NE tired    (B) Calf stretch on inclined step L4 3 x 30 secs ea; NE     Monster walk fwd/bkwd with greenTB abduction resist 2 laps x 30 feet; NE tired legs    (L) Ankle DF mobs with a wedge under balls of foot x 20 mobs; NE    TM: Retro walk @ 15% grade x 1.2 mph x 10 mins; NE    (L) Ankle DF mobs with wedge under balls of foot x 20 reps; NE    Seated: (L) Ankle DF eccentric strengthening with blackTB 30 reps x 10 eccen ct ea; NE    Seated: (L) Hallux PF strengthening with blackTB 2 x 20 reps; NE     Step standing lean on wall (L) LE calf eccentric DF x 6+4 reps x 10 eccen ct ea; NE muscle burn    Step up forward/straddle step onto a 6 inch step with greenTB abduction resist (R/L) LE lead x 10 reps ea; NE tired    (L) Ankle DF mobs with wedge x 10 reps; NE TM: retro walk @ 15% grade x 1.1 mph x 10 mins; NE    (L) Ankle repeated DF with wedge x 10 reps; P, NW anterior ankle    - re-test motion: (L) ankle/foot no ache/weakness on step back up    Repeat (L) Ankle DF mobs with wedge x 10 reps; P, NW    Step standing: (L) LE/Calf eccentric DF from a 4 inch step 7+3 reps x 10 eccen ct ea; NE tired    (L) LE lateral eccentric step down hang from a 6 inch step 10 reps x 5 eccen ct ea; NE tired    (L) LE SLStance with 3 lb medial reach/tap onto a foam roll x 10 rep; NE tired    Shuttle: (L) calf raises 3b x 20 reps;  NE tired    Shuttle: (L) LE plyojump straight with 3b 2 x 10 reps;  NE tired    (L) Ankle DF mobs with wedge x 10 reps; P, NW anterior ankle ache/tightness   Therapeutic Exercise Minutes 39 39 46   Total Time Of Timed Procedures 39 39 46   Total Time Of Service-Based Procedures 0 0 0   Total Treatment Time 40 40 47   HEP (L) Ankle DF mobs with a wedge x 20 reps  (L) Ankle DF eccentric strengthening with blackTB 20 reps x 10 eccen ct ea x 1-2x/day (L) Ankle DF mobs with wedge under balls of foot x 20 reps x 1-2x/day    Seated: (L) Ankle DF eccentric strengthening with blackTB 30-40 reps x 10 eccen ct ea x 1-2x/day (L) Ankle DF mobs with wedge x 10 reps x 2x/day at least        HEP  (L) Ankle DF mobs with wedge x 10 reps x 2x/day at least    Charges     TherEx x 3                                       Plan  Re-assess next session and continue with (L) Ankle/LE strengthening, stability, stretching, patient education, and HEP progression.    Treatment Last 4 Visits  Treatment Day: 14 4/29/2025 5/13/2025 5/16/2025 5/21/2025   LE Treatment   Therapeutic Exercise TM: Retro walk @ 8% grade x 1.0 mph x 10 mins; NE    Standing: (L) Ankle DF mobs with towel roll x 10 reps; P, NW (better)    - no ache/tightness step up/over an 8 inch step    Step standing: (L) Ankle DF eccentric loading on a 4 inch step x 10 reps x 10 eccen ct ea; NE     (L) Ankle DF mobs with towel roll x 10 reps; NE    TM: Retro walk @ 10% grade x 1.2 mph x 10 mins; NE tired    Seated: (L) Ankle DF with towel OP x 10 reps; NE    + eccentric DF with blackTB 10 reps x 10 eccen ct ea; NE    Standing: (L) Ankle DF mobs with wedge x 10 reps; NE    Step standing on 4 inch step (L) Ankle calf raises concentric/eccentric load 5 sets x 5 reps x 5 eccen ct on the last rep; NE tired    (L) LE lateral eccentric step down from a 6 inch step x 10 reps x 5 eccen ct ea; NE tired    (L) LE forward eccentric step down from a 6 inch step x 10 reps x 5 eccen ct ea; NE tired    Monster walk with greenTB abduction resist 2 laps x 30 feet; NE  tired    Standing: (L) Ankle DF mobs with wedge x 10 reps; NE TM: Retro walk @ 10% grade x 1.0 mph x 10 mins; NE     Standing: (L) Ankle DF mobs with wedge x 10 reps; NE    (L) Calf raises from a 4 inch step concentric/eccentric load x 2 x 5 sets x 5 reps ea x 5 eccen ct on last rep; NE tired    Shuttle: (L) LE plyojump straight/lateral 3b 2 x 20 reps ea; NE tired    (L) LE lateral eccen step down hang from an 8 inch step x 10 reps x 5 eccen ct ea; NE tired    Standing: (L) Ankle DF mobs with a wedge x 10 reps; NE TM: Retro walk @ 12% grade x 1.0 mph x 10 mins; NE     Standing:  (L) Ankle DF mobs with wedge x 10 reps; NE     Seated: (L) Ankle DF eccen strengthening with blackTB x 20 reps x 10 eccen ct ea; NE    Step standing with (L) ankle/foot on 4 inch step: concentric/eccentric calf strengthening 7 sets of 5 reps x 5 eccen count on last rep; NE tired    (L) LE lateral step down hang from an 8 inch step x 10 reps x 5-10 eccen ct ea; NE tired    (L) LE forward step down hang from a 6 inch step x 10 reps x 5 eccen ct ea; NE tired    Rhytmic step up onto a 6 inch step with greenTB abduction resist (R/L) LE lead x 20 reps ea; NE tired    (L) Ankle DF mobs with wedge x 10 reps; NE calf stretch/tight   Therapeutic Exercise Minutes 30 44 39 42   Total Time Of Timed Procedures 30 44 39 42   Total Time Of Service-Based Procedures 0 0 0 0   Total Treatment Time 31 46 40 45   HEP (L) Ankle DF mobs with towel roll x 10 reps x 2-3x/day Standing: (L) Ankle DF mobs with wedge x 10 reps x 1-2x/day    (L) Ankle DF eccentric strengthening with blackTB x 10-20 reps x 10 eccen ct ea x 1-2x/day     Standing: (L) Ankle DF mobs with wedge x 10 reps x 1-2x/day    (L) Ankle DF eccentric strengthening with blackTB 20 reps x 10 eccen ct ea x 1-2x/day Standing:  (L) Ankle DF mobs with wedge x 10 reps x 1-2x/day    Seated: (L) Ankle DF eccen strengthening with blackTB x 20 reps x 10 eccen ct ea x 1-2x/day        HEP  Standing:  (L) Ankle DF  mobs with wedge x 10 reps x 1-2x/day    Seated: (L) Ankle DF eccen strengthening with blackTB x 20 reps x 10 eccen ct ea x 1-2x/day    Charges     TherEx x 3

## 2025-05-23 ENCOUNTER — OFFICE VISIT (OUTPATIENT)
Dept: PHYSICAL THERAPY | Age: 50
End: 2025-05-23
Attending: FAMILY MEDICINE
Payer: COMMERCIAL

## 2025-05-23 PROCEDURE — 97110 THERAPEUTIC EXERCISES: CPT | Performed by: PHYSICAL THERAPIST

## 2025-05-23 NOTE — PROGRESS NOTES
Patient: Sergei Maradiaga (50 year old, female) Referring Provider:  Insurance:   Diagnosis: Derangement of ankle, left (M24.172) [facility]   Date of Surgery: NA Next MD visit:  N/A   Precautions:  None NA Referral Information:    Date of Evaluation: Req: 0, Auth: 0, Exp:     02/06/25 POC Auth Visits:  24       Today's Date   5/23/2025    Subjective  Huyen reported that her (L) ankle has been feeling good. 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  She continue to do her HEP ((L) ankle DF mobs) regularly.       Pain: 0/10     Objective  No pain/ache walking; WFL of (L) Ankle AROM in all directions with 4/5 MMT in all motions.       Assessment  Huyen continue to progress with (L) ankle/foot/LE strengthening/stability exercises without producing symptoms.  She was reminded to continue with her DP exericse to maintain her improved condition.  She understood and agreed with the treatment plan.  All questions were answered at his time.    Goals (to be met in 24 visits)   1. Patient to consistently perform their HEP and it's progression to maintain their improved condition.  2. Patient to have reduced and abolished symptoms to to be able to Inability to walk, climb (down>up) stairs, drive, get in/out of her car, step up/over tub, lay down her sides, and do home chores without producing or increasing symptoms in the (L) lower leg/ankle.   3. Patient to have WNL of (L) Shoulder AROM in all directions with 4/5 MMT in all motions to promote all home, work, recreational, and functional activities without discomfort or deviation.        Plan  Re-assess next session and continue with (L) Ankle/LE strengthening, stability, stretching, patient education, and HEP progression.    Treatment Last 4 Visits  Treatment Day: 10       3/18/2025 3/21/2025 3/26/2025   LE Treatment   Therapeutic Exercise TM: Retro walk @ 8% grade x 1.2 mph x 10 mins; NE    (L) Ankle DF mobs with a wedge x  20 reps; NE    (L) Ankle DF eccentric strengthening with blackTB 2 x 10 reps x 10 eccen ct ea; NE    (L) Hallux PF with blackTB 2 x 20 reps; NE    Shuttle: (L) leg press 6b x 20 reps; NE    Shuttle: (L) calf raises 3b x 20 reps; NE tired    (B) Calf stretch on inclined step L4 3 x 30 secs ea; NE     Monster walk fwd/bkwd with greenTB abduction resist 2 laps x 30 feet; NE tired legs    (L) Ankle DF mobs with a wedge under balls of foot x 20 mobs; NE    TM: Retro walk @ 15% grade x 1.2 mph x 10 mins; NE    (L) Ankle DF mobs with wedge under balls of foot x 20 reps; NE    Seated: (L) Ankle DF eccentric strengthening with blackTB 30 reps x 10 eccen ct ea; NE    Seated: (L) Hallux PF strengthening with blackTB 2 x 20 reps; NE     Step standing lean on wall (L) LE calf eccentric DF x 6+4 reps x 10 eccen ct ea; NE muscle burn    Step up forward/straddle step onto a 6 inch step with greenTB abduction resist (R/L) LE lead x 10 reps ea; NE tired    (L) Ankle DF mobs with wedge x 10 reps; NE TM: retro walk @ 15% grade x 1.1 mph x 10 mins; NE    (L) Ankle repeated DF with wedge x 10 reps; P, NW anterior ankle    - re-test motion: (L) ankle/foot no ache/weakness on step back up    Repeat (L) Ankle DF mobs with wedge x 10 reps; P, NW    Step standing: (L) LE/Calf eccentric DF from a 4 inch step 7+3 reps x 10 eccen ct ea; NE tired    (L) LE lateral eccentric step down hang from a 6 inch step 10 reps x 5 eccen ct ea; NE tired    (L) LE SLStance with 3 lb medial reach/tap onto a foam roll x 10 rep; NE tired    Shuttle: (L) calf raises 3b x 20 reps;  NE tired    Shuttle: (L) LE plyojump straight with 3b 2 x 10 reps; NE tired    (L) Ankle DF mobs with wedge x 10 reps; P, NW anterior ankle ache/tightness   Therapeutic Exercise Minutes 39 39 46   Total Time Of Timed Procedures 39 39 46   Total Time Of Service-Based Procedures 0 0 0   Total Treatment Time 40 40 47   HEP (L) Ankle DF mobs with a wedge x 20 reps  (L) Ankle DF eccentric  strengthening with blackTB 20 reps x 10 eccen ct ea x 1-2x/day (L) Ankle DF mobs with wedge under balls of foot x 20 reps x 1-2x/day    Seated: (L) Ankle DF eccentric strengthening with blackTB 30-40 reps x 10 eccen ct ea x 1-2x/day (L) Ankle DF mobs with wedge x 10 reps x 2x/day at least        HEP  (L) Ankle DF mobs with wedge x 10 reps x 2x/day at least    Charges     TherEx x 3                                           Plan  Re-assess next session and continue with (L) Ankle/LE strengthening, stability, stretching, patient education, and HEP progression.    Treatment Last 4 Visits  Treatment Day: 15       5/13/2025 5/16/2025 5/21/2025 5/23/2025   LE Treatment   Therapeutic Exercise TM: Retro walk @ 10% grade x 1.2 mph x 10 mins; NE tired    Seated: (L) Ankle DF with towel OP x 10 reps; NE    + eccentric DF with blackTB 10 reps x 10 eccen ct ea; NE    Standing: (L) Ankle DF mobs with wedge x 10 reps; NE    Step standing on 4 inch step (L) Ankle calf raises concentric/eccentric load 5 sets x 5 reps x 5 eccen ct on the last rep; NE tired    (L) LE lateral eccentric step down from a 6 inch step x 10 reps x 5 eccen ct ea; NE tired    (L) LE forward eccentric step down from a 6 inch step x 10 reps x 5 eccen ct ea; NE tired    Monster walk with greenTB abduction resist 2 laps x 30 feet; NE tired    Standing: (L) Ankle DF mobs with wedge x 10 reps; NE TM: Retro walk @ 10% grade x 1.0 mph x 10 mins; NE     Standing: (L) Ankle DF mobs with wedge x 10 reps; NE    (L) Calf raises from a 4 inch step concentric/eccentric load x 2 x 5 sets x 5 reps ea x 5 eccen ct on last rep; NE tired    Shuttle: (L) LE plyojump straight/lateral 3b 2 x 20 reps ea; NE tired    (L) LE lateral eccen step down hang from an 8 inch step x 10 reps x 5 eccen ct ea; NE tired    Standing: (L) Ankle DF mobs with a wedge x 10 reps; NE TM: Retro walk @ 12% grade x 1.0 mph x 10 mins; NE     Standing:  (L) Ankle DF mobs with wedge x 10 reps; NE     Seated:  (L) Ankle DF eccen strengthening with blackTB x 20 reps x 10 eccen ct ea; NE    Step standing with (L) ankle/foot on 4 inch step: concentric/eccentric calf strengthening 7 sets of 5 reps x 5 eccen count on last rep; NE tired    (L) LE lateral step down hang from an 8 inch step x 10 reps x 5-10 eccen ct ea; NE tired    (L) LE forward step down hang from a 6 inch step x 10 reps x 5 eccen ct ea; NE tired    Rhytmic step up onto a 6 inch step with greenTB abduction resist (R/L) LE lead x 20 reps ea; NE tired    (L) Ankle DF mobs with wedge x 10 reps; NE calf stretch/tight TM: Retro walk @ 15% grade x 1.2 mph x 10 mins; NE     Standing: (L) Ankle DF mobs with wedge x 10 reps; NE    Step standing (L) ankle calf raises concentric/eccentric x 10 sets x 5 reps x 5 eccen ct on last rep; NE    Shuttle: (L) plyometric jump straight and lateral 4b 2 x 10 reps; NE    Step up/over bosu lunge x 5 reps ea; NE    Inverted bosu squat x 10 reps x 10 cts ea; NE    (L) Ankle DF mobs with wedge x 10 mobs ea; NE   Therapeutic Exercise Minutes 44 39 42 44   Total Time Of Timed Procedures 44 39 42 44   Total Time Of Service-Based Procedures 0 0 0 0   Total Treatment Time 46 40 45 45   HEP Standing: (L) Ankle DF mobs with wedge x 10 reps x 1-2x/day    (L) Ankle DF eccentric strengthening with blackTB x 10-20 reps x 10 eccen ct ea x 1-2x/day     Standing: (L) Ankle DF mobs with wedge x 10 reps x 1-2x/day    (L) Ankle DF eccentric strengthening with blackTB 20 reps x 10 eccen ct ea x 1-2x/day Standing:  (L) Ankle DF mobs with wedge x 10 reps x 1-2x/day    Seated: (L) Ankle DF eccen strengthening with blackTB x 20 reps x 10 eccen ct ea x 1-2x/day Standing: (L) Ankle DF mobs with wedge x 10 reps x 1-2x/day    Step standing (L) ankle calf raises concentric/eccentric x 10 sets x 5 reps x 5 eccen ct on last rep x 1-2x/day        HEP  Standing: (L) Ankle DF mobs with wedge x 10 reps x 1-2x/day    Step standing (L) ankle calf raises  concentric/eccentric x 10 sets x 5 reps x 5 eccen ct on last rep x 1-2x/day    Charges     TherEx x 3

## 2025-05-30 ENCOUNTER — OFFICE VISIT (OUTPATIENT)
Dept: PHYSICAL THERAPY | Age: 50
End: 2025-05-30
Attending: FAMILY MEDICINE
Payer: COMMERCIAL

## 2025-05-30 PROCEDURE — 97110 THERAPEUTIC EXERCISES: CPT | Performed by: PHYSICAL THERAPIST

## 2025-05-30 NOTE — PROGRESS NOTES
Patient: Sergei Maradiaga (50 year old, female) Referring Provider:  Insurance:   Diagnosis: Derangement of ankle, left (M24.172) [facility]   Date of Surgery: NA Next MD visit:  N/A   Precautions:  None NA Referral Information:    Date of Evaluation: Req: 0, Auth: 0, Exp:     02/06/25 POC Auth Visits:  24       Today's Date   5/30/2025    Subjective  Huyen does not have (L) ankle/foot pain/ache at this time.  She is doing her HEP regularly.       Pain: 0/10     Objective  No pain/ache walking; WFL of (L) Ankle AROM in all directions with 4/5 MMT in all motions.        Assessment  Huyen did not report any pain and she has WNL of (L) AROM with 4/5 MMT at this time.  She required minimal cueing to correct form.  All questions were answered at this time.    Goals (to be met in 24 visits)   1. Patient to consistently perform their HEP and it's progression to maintain their improved condition.  2. Patient to have reduced and abolished symptoms to to be able to Inability to walk, climb (down>up) stairs, drive, get in/out of her car, step up/over tub, lay down her sides, and do home chores without producing or increasing symptoms in the (L) lower leg/ankle.   3. Patient to have WNL of (L) Shoulder AROM in all directions with 4/5 MMT in all motions to promote all home, work, recreational, and functional activities without discomfort or deviation.        Plan  Re-assess next session and continue with (L) Ankle/LE strengthening, stability, stretching, patient education, and HEP progression.    Treatment Last 4 Visits  Treatment Day: 10       3/18/2025 3/21/2025 3/26/2025   LE Treatment   Therapeutic Exercise TM: Retro walk @ 8% grade x 1.2 mph x 10 mins; NE    (L) Ankle DF mobs with a wedge x 20 reps; NE    (L) Ankle DF eccentric strengthening with blackTB 2 x 10 reps x 10 eccen ct ea; NE    (L) Hallux PF with blackTB 2 x 20 reps; NE    Shuttle: (L) leg press 6b x 20 reps; NE    Shuttle: (L) calf raises 3b x 20  reps; NE tired    (B) Calf stretch on inclined step L4 3 x 30 secs ea; NE     Monster walk fwd/bkwd with greenTB abduction resist 2 laps x 30 feet; NE tired legs    (L) Ankle DF mobs with a wedge under balls of foot x 20 mobs; NE    TM: Retro walk @ 15% grade x 1.2 mph x 10 mins; NE    (L) Ankle DF mobs with wedge under balls of foot x 20 reps; NE    Seated: (L) Ankle DF eccentric strengthening with blackTB 30 reps x 10 eccen ct ea; NE    Seated: (L) Hallux PF strengthening with blackTB 2 x 20 reps; NE     Step standing lean on wall (L) LE calf eccentric DF x 6+4 reps x 10 eccen ct ea; NE muscle burn    Step up forward/straddle step onto a 6 inch step with greenTB abduction resist (R/L) LE lead x 10 reps ea; NE tired    (L) Ankle DF mobs with wedge x 10 reps; NE TM: retro walk @ 15% grade x 1.1 mph x 10 mins; NE    (L) Ankle repeated DF with wedge x 10 reps; P, NW anterior ankle    - re-test motion: (L) ankle/foot no ache/weakness on step back up    Repeat (L) Ankle DF mobs with wedge x 10 reps; P, NW    Step standing: (L) LE/Calf eccentric DF from a 4 inch step 7+3 reps x 10 eccen ct ea; NE tired    (L) LE lateral eccentric step down hang from a 6 inch step 10 reps x 5 eccen ct ea; NE tired    (L) LE SLStance with 3 lb medial reach/tap onto a foam roll x 10 rep; NE tired    Shuttle: (L) calf raises 3b x 20 reps;  NE tired    Shuttle: (L) LE plyojump straight with 3b 2 x 10 reps; NE tired    (L) Ankle DF mobs with wedge x 10 reps; P, NW anterior ankle ache/tightness   Therapeutic Exercise Minutes 39 39 46   Total Time Of Timed Procedures 39 39 46   Total Time Of Service-Based Procedures 0 0 0   Total Treatment Time 40 40 47   HEP (L) Ankle DF mobs with a wedge x 20 reps  (L) Ankle DF eccentric strengthening with blackTB 20 reps x 10 eccen ct ea x 1-2x/day (L) Ankle DF mobs with wedge under balls of foot x 20 reps x 1-2x/day    Seated: (L) Ankle DF eccentric strengthening with blackTB 30-40 reps x 10 eccen ct ea x  1-2x/day (L) Ankle DF mobs with wedge x 10 reps x 2x/day at least        HEP  (L) Ankle DF mobs with wedge x 10 reps x 2x/day at least    Charges     TherEx x 3                                               Plan  Re-assess next session and continue with (L) Ankle/LE strengthening, stability, stretching, patient education, and HEP progression.    Treatment Last 4 Visits  Treatment Day: 16 5/16/2025 5/21/2025 5/23/2025 5/30/2025   LE Treatment   Therapeutic Exercise TM: Retro walk @ 10% grade x 1.0 mph x 10 mins; NE     Standing: (L) Ankle DF mobs with wedge x 10 reps; NE    (L) Calf raises from a 4 inch step concentric/eccentric load x 2 x 5 sets x 5 reps ea x 5 eccen ct on last rep; NE tired    Shuttle: (L) LE plyojump straight/lateral 3b 2 x 20 reps ea; NE tired    (L) LE lateral eccen step down hang from an 8 inch step x 10 reps x 5 eccen ct ea; NE tired    Standing: (L) Ankle DF mobs with a wedge x 10 reps; NE TM: Retro walk @ 12% grade x 1.0 mph x 10 mins; NE     Standing:  (L) Ankle DF mobs with wedge x 10 reps; NE     Seated: (L) Ankle DF eccen strengthening with blackTB x 20 reps x 10 eccen ct ea; NE    Step standing with (L) ankle/foot on 4 inch step: concentric/eccentric calf strengthening 7 sets of 5 reps x 5 eccen count on last rep; NE tired    (L) LE lateral step down hang from an 8 inch step x 10 reps x 5-10 eccen ct ea; NE tired    (L) LE forward step down hang from a 6 inch step x 10 reps x 5 eccen ct ea; NE tired    Rhytmic step up onto a 6 inch step with greenTB abduction resist (R/L) LE lead x 20 reps ea; NE tired    (L) Ankle DF mobs with wedge x 10 reps; NE calf stretch/tight TM: Retro walk @ 15% grade x 1.2 mph x 10 mins; NE     Standing: (L) Ankle DF mobs with wedge x 10 reps; NE    Step standing (L) ankle calf raises concentric/eccentric x 10 sets x 5 reps x 5 eccen ct on last rep; NE    Shuttle: (L) plyometric jump straight and lateral 4b 2 x 10 reps; NE    Step up/over bosu lunge x 5  reps ea; NE    Inverted bosu squat x 10 reps x 10 cts ea; NE    (L) Ankle DF mobs with wedge x 10 mobs ea; NE TM: Retro walk @ 15% grade x 1.2 mph x 10 mins; NE      Standing: (L) Ankle DF mobs with wedge x 10 reps; NE     (L) ankle calf raises on 4 inch step concentric/eccentric x 10 sets x 5 reps x 5 eccen ct on last rep; NE     Shuttle: (L) plyometric jump straight and lateral 4b 2 x 20 reps; NE     Inverted bosu squat x 10 reps x 10 cts ea; NE     (L) Ankle DF mobs with wedge x 10 mobs ea; NE     Therapeutic Exercise Minutes 39 42 44 40   Total Time Of Timed Procedures 39 42 44 40   Total Time Of Service-Based Procedures 0 0 0 0   Total Treatment Time 40 45 45 41   HEP Standing: (L) Ankle DF mobs with wedge x 10 reps x 1-2x/day    (L) Ankle DF eccentric strengthening with blackTB 20 reps x 10 eccen ct ea x 1-2x/day Standing:  (L) Ankle DF mobs with wedge x 10 reps x 1-2x/day    Seated: (L) Ankle DF eccen strengthening with blackTB x 20 reps x 10 eccen ct ea x 1-2x/day Standing: (L) Ankle DF mobs with wedge x 10 reps x 1-2x/day    Step standing (L) ankle calf raises concentric/eccentric x 10 sets x 5 reps x 5 eccen ct on last rep x 1-2x/day (L) Ankle DF mobs with towel pad x 10-20 reps x 1-2x/day    (L) Ankle/Calf raises 10 sets x 5 reps ea x 1-2x/day        HEP  (L) Ankle DF mobs with towel pad x 10-20 reps x 1-2x/day    (L) Ankle/Calf raises 10 sets x 5 reps ea x 1-2x/day    Charges     TherEx x 3

## 2025-06-06 ENCOUNTER — OFFICE VISIT (OUTPATIENT)
Dept: PHYSICAL THERAPY | Age: 50
End: 2025-06-06
Attending: FAMILY MEDICINE
Payer: COMMERCIAL

## 2025-06-06 PROCEDURE — 97110 THERAPEUTIC EXERCISES: CPT | Performed by: PHYSICAL THERAPIST

## 2025-06-06 NOTE — PROGRESS NOTES
Patient: Sergei Maradiaga (50 year old, female) Referring Provider:  Insurance:   Diagnosis: Derangement of ankle, left (M24.172) Jonathan Mcclendonkristian  [facility]   Date of Surgery: NA Next MD visit:  N/A   Precautions:  None NA Referral Information:    Date of Evaluation: Req: 0, Auth: 0, Exp:     02/06/25 POC Auth Visits:  24       Today's Date   6/6/2025    Subjective  huyen report that she is feeling good in the (L) ankle/foot.  She is busy with everything happening with her family.       Pain: 0/10     Objective  No pain/ache walking; WFL of (L) Ankle AROM in all directions with 4/5 MMT in all motions.       Assessment  Huyen remained symptomfree in the (L) ankle/foot and is able to do exercises wih minimal cueing to correct form, alignment, and exercsie techinque.  All questions were answered at this time.    Goals (to be met in 24 visits)   1. Patient to consistently perform their HEP and it's progression to maintain their improved condition.  2. Patient to have reduced and abolished symptoms to to be able to Inability to walk, climb (down>up) stairs, drive, get in/out of her car, step up/over tub, lay down her sides, and do home chores without producing or increasing symptoms in the (L) lower leg/ankle.   3. Patient to have WNL of (L) Shoulder AROM in all directions with 4/5 MMT in all motions to promote all home, work, recreational, and functional activities without discomfort or deviation.        Plan  Re-assess next session and continue with (L) Ankle/LE strengthening, stability, stretching, patient education, and HEP progression.    Treatment Last 4 Visits  Treatment Day: 10       3/18/2025 3/21/2025 3/26/2025   LE Treatment   Therapeutic Exercise TM: Retro walk @ 8% grade x 1.2 mph x 10 mins; NE    (L) Ankle DF mobs with a wedge x 20 reps; NE    (L) Ankle DF eccentric strengthening with blackTB 2 x 10 reps x 10 eccen ct ea; NE    (L) Hallux PF with blackTB 2 x 20 reps; NE    Shuttle: (L) leg press 6b x 20 reps;  NE    Shuttle: (L) calf raises 3b x 20 reps; NE tired    (B) Calf stretch on inclined step L4 3 x 30 secs ea; NE     Monster walk fwd/bkwd with greenTB abduction resist 2 laps x 30 feet; NE tired legs    (L) Ankle DF mobs with a wedge under balls of foot x 20 mobs; NE    TM: Retro walk @ 15% grade x 1.2 mph x 10 mins; NE    (L) Ankle DF mobs with wedge under balls of foot x 20 reps; NE    Seated: (L) Ankle DF eccentric strengthening with blackTB 30 reps x 10 eccen ct ea; NE    Seated: (L) Hallux PF strengthening with blackTB 2 x 20 reps; NE     Step standing lean on wall (L) LE calf eccentric DF x 6+4 reps x 10 eccen ct ea; NE muscle burn    Step up forward/straddle step onto a 6 inch step with greenTB abduction resist (R/L) LE lead x 10 reps ea; NE tired    (L) Ankle DF mobs with wedge x 10 reps; NE TM: retro walk @ 15% grade x 1.1 mph x 10 mins; NE    (L) Ankle repeated DF with wedge x 10 reps; P, NW anterior ankle    - re-test motion: (L) ankle/foot no ache/weakness on step back up    Repeat (L) Ankle DF mobs with wedge x 10 reps; P, NW    Step standing: (L) LE/Calf eccentric DF from a 4 inch step 7+3 reps x 10 eccen ct ea; NE tired    (L) LE lateral eccentric step down hang from a 6 inch step 10 reps x 5 eccen ct ea; NE tired    (L) LE SLStance with 3 lb medial reach/tap onto a foam roll x 10 rep; NE tired    Shuttle: (L) calf raises 3b x 20 reps;  NE tired    Shuttle: (L) LE plyojump straight with 3b 2 x 10 reps; NE tired    (L) Ankle DF mobs with wedge x 10 reps; P, NW anterior ankle ache/tightness   Therapeutic Exercise Minutes 39 39 46   Total Time Of Timed Procedures 39 39 46   Total Time Of Service-Based Procedures 0 0 0   Total Treatment Time 40 40 47   HEP (L) Ankle DF mobs with a wedge x 20 reps  (L) Ankle DF eccentric strengthening with blackTB 20 reps x 10 eccen ct ea x 1-2x/day (L) Ankle DF mobs with wedge under balls of foot x 20 reps x 1-2x/day    Seated: (L) Ankle DF eccentric strengthening with  blackTB 30-40 reps x 10 eccen ct ea x 1-2x/day (L) Ankle DF mobs with wedge x 10 reps x 2x/day at least        HEP  (L) Ankle DF mobs with wedge x 10 reps x 2x/day at least    Charges     TherEx x 3                                                   Plan  Re-assess next session and continue with (L) Ankle/LE strengthening, stability, stretching, patient education, and HEP progression.    Treatment Last 4 Visits  Treatment Day: 17 5/21/2025 5/23/2025 5/30/2025 6/6/2025   LE Treatment   Therapeutic Exercise TM: Retro walk @ 12% grade x 1.0 mph x 10 mins; NE     Standing:  (L) Ankle DF mobs with wedge x 10 reps; NE     Seated: (L) Ankle DF eccen strengthening with blackTB x 20 reps x 10 eccen ct ea; NE    Step standing with (L) ankle/foot on 4 inch step: concentric/eccentric calf strengthening 7 sets of 5 reps x 5 eccen count on last rep; NE tired    (L) LE lateral step down hang from an 8 inch step x 10 reps x 5-10 eccen ct ea; NE tired    (L) LE forward step down hang from a 6 inch step x 10 reps x 5 eccen ct ea; NE tired    Rhytmic step up onto a 6 inch step with greenTB abduction resist (R/L) LE lead x 20 reps ea; NE tired    (L) Ankle DF mobs with wedge x 10 reps; NE calf stretch/tight TM: Retro walk @ 15% grade x 1.2 mph x 10 mins; NE     Standing: (L) Ankle DF mobs with wedge x 10 reps; NE    Step standing (L) ankle calf raises concentric/eccentric x 10 sets x 5 reps x 5 eccen ct on last rep; NE    Shuttle: (L) plyometric jump straight and lateral 4b 2 x 10 reps; NE    Step up/over bosu lunge x 5 reps ea; NE    Inverted bosu squat x 10 reps x 10 cts ea; NE    (L) Ankle DF mobs with wedge x 10 mobs ea; NE TM: Retro walk @ 15% grade x 1.2 mph x 10 mins; NE      Standing: (L) Ankle DF mobs with wedge x 10 reps; NE     (L) ankle calf raises on 4 inch step concentric/eccentric x 10 sets x 5 reps x 5 eccen ct on last rep; NE     Shuttle: (L) plyometric jump straight and lateral 4b 2 x 20 reps; NE     Inverted  bosu squat x 10 reps x 10 cts ea; NE     (L) Ankle DF mobs with wedge x 10 mobs ea; NE   TM: Retro walk @ 15% grade x 1.2 mph x 10 mins; NE      Standing: (L) Ankle DF mobs with wedge x 10 reps; NE     (L) ankle calf raises on 4 inch step concentric/eccentric x 10 sets x 5 reps x 5 eccen ct on last rep; NE     Shuttle: (L) plyometric jump straight 5b 2 x 20 reps and lateral 5b x 20+10 reps; NE    Step up/over lunge bosu onto a rebounder x 10 reps; NE     Inverted bosu squat x 10 reps x 10 cts ea; NE     (L) Ankle DF mobs with wedge x 10 mobs ea; NE     Therapeutic Exercise Minutes 42 44 40 42   Total Time Of Timed Procedures 42 44 40 42   Total Time Of Service-Based Procedures 0 0 0 0   Total Treatment Time 45 45 41 43   HEP Standing:  (L) Ankle DF mobs with wedge x 10 reps x 1-2x/day    Seated: (L) Ankle DF eccen strengthening with blackTB x 20 reps x 10 eccen ct ea x 1-2x/day Standing: (L) Ankle DF mobs with wedge x 10 reps x 1-2x/day    Step standing (L) ankle calf raises concentric/eccentric x 10 sets x 5 reps x 5 eccen ct on last rep x 1-2x/day (L) Ankle DF mobs with towel pad x 10-20 reps x 1-2x/day    (L) Ankle/Calf raises 10 sets x 5 reps ea x 1-2x/day   Standing: (L) Ankle DF mobs with wedge x 10 reps x 1-2x/day     (L) ankle calf raises on 4 inch step concentric/eccentric x 10 sets x 5 reps x 5 eccen ct on last rep x 1-2x/day        HEP    Standing: (L) Ankle DF mobs with wedge x 10 reps x 1-2x/day     (L) ankle calf raises on 4 inch step concentric/eccentric x 10 sets x 5 reps x 5 eccen ct on last rep x 1-2x/day    Charges     TherEx x 3

## 2025-07-02 ENCOUNTER — APPOINTMENT (OUTPATIENT)
Dept: PHYSICAL THERAPY | Age: 50
End: 2025-07-02
Attending: FAMILY MEDICINE

## (undated) NOTE — LETTER
Date: 4/18/2025    Patient Name: Sergei Maradiaga          To Whom it may concern:     This letter has been written at the patient's request. The above patient was seen at [facility] for treatment of a medical condition.        The patient is cleared to return as of 2/26/25 normal work hours but only remotely as she is still recovering from her injuries. Restrictions in place until May 26, 2025.        Sincerely,       Jonathan Birmingham DO, CAM   [facility]

## (undated) NOTE — LETTER
Date: 1/10/2025    Patient Name: Sergei Maradiaga          To Whom it may concern:    This letter has been written at the patient's request. The above patient was seen at [facility] for treatment of a medical condition.    This patient should be excused from attending work from 1/10/25 through 1/24/25, due to injury.            Sincerely,    Jonathan VELA DO

## (undated) NOTE — LETTER
1/2/2025          To Whom It May Concern:    Sergei Maradiaga is currently under my medical care after fall and associated injuries.    Please excuse Sergei from work through Wednesday, 1/8/2025.  She may return on 1/9/2025 if doing well.    If you require additional information please contact our office.    Sincerely,        Cammy Capps MD          Document generated by:  Cammy Capps MD

## (undated) NOTE — LETTER
Date: 2/25/2025    Patient Name: Sergei Maradiaga        To Whom it may concern:    This letter has been written at the patient's request. The above patient was seen at [facility] for treatment of a medical condition.      The patient is cleared to return on 2/26/25 normal work hours but only remotely as she is still recovering from her injuries. Restrictions in place for the next 4 weeks (2/26/25 -3/26/25) with follow up planned around that time.      Sincerely,      Jonathan Birmingham DO, CAM   [facility]

## (undated) NOTE — LETTER
Date: 2/25/2025    Patient Name: Sergei Maradiaga          To Whom it may concern:    This letter has been written at the patient's request. The above patient was seen at [facility] for treatment of a medical condition.      The patient is cleared to return normal work hours but only remotely as she is still recovering from her injuries. Restrictions in place for the next 4 weeks with follow up planned around that time.      Sincerely,      Jonathan Birmingham DO, CAJACKM   [facility]